# Patient Record
Sex: FEMALE | Race: WHITE | Employment: FULL TIME | URBAN - METROPOLITAN AREA
[De-identification: names, ages, dates, MRNs, and addresses within clinical notes are randomized per-mention and may not be internally consistent; named-entity substitution may affect disease eponyms.]

---

## 2017-01-18 RX ORDER — HYDROCHLOROTHIAZIDE 25 MG/1
TABLET ORAL
Qty: 30 TAB | Refills: 0 | Status: SHIPPED | OUTPATIENT
Start: 2017-01-18 | End: 2017-03-03 | Stop reason: SDUPTHER

## 2017-02-10 ENCOUNTER — DOCUMENTATION ONLY (OUTPATIENT)
Dept: FAMILY MEDICINE CLINIC | Age: 37
End: 2017-02-10

## 2017-02-10 NOTE — PROGRESS NOTES
Left message on [phone] for patient to call back. Over-due for follow-up appointment. Due:12/6/2016.

## 2017-02-28 ENCOUNTER — TELEPHONE (OUTPATIENT)
Dept: SURGERY | Age: 37
End: 2017-02-28

## 2017-03-03 ENCOUNTER — OFFICE VISIT (OUTPATIENT)
Dept: SURGERY | Age: 37
End: 2017-03-03

## 2017-03-03 VITALS
TEMPERATURE: 98.2 F | BODY MASS INDEX: 43.4 KG/M2 | SYSTOLIC BLOOD PRESSURE: 150 MMHG | WEIGHT: 293 LBS | HEIGHT: 69 IN | HEART RATE: 100 BPM | DIASTOLIC BLOOD PRESSURE: 84 MMHG

## 2017-03-03 DIAGNOSIS — I10 ESSENTIAL HYPERTENSION: Primary | ICD-10-CM

## 2017-03-03 DIAGNOSIS — N39.3 SUI (STRESS URINARY INCONTINENCE, FEMALE): ICD-10-CM

## 2017-03-03 DIAGNOSIS — R53.82 CHRONIC FATIGUE: ICD-10-CM

## 2017-03-03 DIAGNOSIS — E66.01 MORBID OBESITY WITH BMI OF 50.0-59.9, ADULT (HCC): ICD-10-CM

## 2017-03-03 DIAGNOSIS — E78.00 HYPERCHOLESTEREMIA: ICD-10-CM

## 2017-03-03 DIAGNOSIS — G47.10 HYPERSOMNOLENCE: ICD-10-CM

## 2017-03-03 RX ORDER — IBUPROFEN 200 MG
TABLET ORAL
COMMUNITY
End: 2017-06-29 | Stop reason: ALTCHOICE

## 2017-03-03 RX ORDER — LORATADINE 10 MG/1
10 TABLET ORAL DAILY
Qty: 30 TAB | Refills: 11 | Status: SHIPPED | OUTPATIENT
Start: 2017-03-03 | End: 2019-08-12

## 2017-03-03 RX ORDER — LABETALOL 200 MG/1
200 TABLET, FILM COATED ORAL 2 TIMES DAILY
Qty: 60 TAB | Refills: 0 | Status: SHIPPED | OUTPATIENT
Start: 2017-03-03 | End: 2017-04-09 | Stop reason: SDUPTHER

## 2017-03-03 RX ORDER — HYDROCHLOROTHIAZIDE 25 MG/1
25 TABLET ORAL DAILY
Qty: 30 TAB | Refills: 0 | Status: SHIPPED | OUTPATIENT
Start: 2017-03-03 | End: 2017-05-04 | Stop reason: SDUPTHER

## 2017-03-03 NOTE — TELEPHONE ENCOUNTER
From: Marie Farris  To:  Silvina Macedo MD  Sent: 3/3/2017 10:02 AM EST  Subject: Medication Renewal Request    Original authorizing provider: MD Marie Locke would like a refill of the following medications:  loratadine (CLARITIN) 10 mg tablet Silvina Macedo MD]  labetalol (NORMODYNE) 200 mg tablet Silvina Macedo MD]  hydroCHLOROthiazide (HYDRODIURIL) 25 mg tablet Silvina Macedo MD]    Preferred pharmacy: [facility] #[id number] - [place], [place] - [address]    Comment:

## 2017-03-03 NOTE — PROGRESS NOTES
Pt ID confirmed    Weight Loss Metrics 3/3/2017 3/3/2017 12/24/2016 11/8/2016 10/18/2016 6/23/2016 3/23/2016   Pre op / Initial Wt 343 - - - - - -   Today's Wt - 343 lb 341 lb 14.9 oz 340 lb 340 lb 333 lb 333 lb   BMI - 50.65 kg/m2 50.49 kg/m2 50.21 kg/m2 50.21 kg/m2 49.15 kg/m2 49.15 kg/m2   Ideal Body Wt 146 - - - - - -   Excess Body Wt 197 - - - - - -   Goal Wt 185 - - - - - -   Wt loss to date 0 - - - - - -   % Wt Loss 0 - - - - - -   80% .6 - - - - - -       Body mass index is 50.65 kg/(m^2).

## 2017-03-03 NOTE — PROGRESS NOTES
Attestation:  I have personally seen and examined this patient and independently confirmed all of the above findings noted by the CARISSA. I have independently assessed her ongoing problems and formulated the plan of treatment which will be carried out as per above. Review of Systems:  Positive in BOLD    CONST: Fever, weight loss, fatigue or chills  GI: Nausea, vomiting, abdominal pain, change in bowel habits, hematochezia, melena, and GERD   INTEG: Dermatitis, abnormal moles  HEENT: Recent changes in vision, vertigo, epistaxis, dysphagia and hoarseness  CV: Chest pain, palpitations, HTN, edema and varicosities  RESP: Cough, shortness of breath, wheezing, hemoptysis, snoring and reactive airway disease  : Hematuria, dysuria, frequency, urgency, nocturia and stress urinary incontinence   MS: Weakness, joint pain and arthritis  ENDO: Diabetes, thyroid disease, polyuria, polydipsia, polyphagia, poor wound healing, heat intolerance, cold intolerance  LYMPH/HEME: Anemia, bruising and history of blood transfusions  NEURO: Dizziness, headache, fainting, seizures and stroke  PSYCH: Anxiety and depression    Dre Sue.  Vangie Modi MD, FACS

## 2017-03-03 NOTE — PROGRESS NOTES
Initial Consultation for Bariatric Surgery     Jerrod Armas is a 44 y.o. female who comes into the office today for initial consultation for the surgical options for the treatment of morbid obesity. She has tried a variety of weight-loss attempts but has yet to meet with lasting success. She has lost weight on various diet programs, but that weight always seems to return. Maximum weight lost on a diet is about 20 lbs, but that the weight always seems to return. Today, she is Height: 5' 9\" (175.3 cm) , Weight: 155.6 kg (343 lb) for a BMI of Body mass index is 50.65 kg/(m^2). Maximum weight is today. It is due to severe obesity, which is further complicated by comorbid conditions such as hypertension, hyperlipidemia, obstructive sleep apnea - clinical and weight related arthopathies that patient is now seeking out bariatric surgery, specifically, the gastric bypass. Weight Loss Metrics 3/3/2017 3/3/2017 12/24/2016 11/8/2016 10/18/2016 6/23/2016 3/23/2016   Pre op / Initial Wt 343 - - - - - -   Today's Wt - 343 lb 341 lb 14.9 oz 340 lb 340 lb 333 lb 333 lb   BMI - 50.65 kg/m2 50.49 kg/m2 50.21 kg/m2 50.21 kg/m2 49.15 kg/m2 49.15 kg/m2   Ideal Body Wt 146 - - - - - -   Excess Body Wt 197 - - - - - -   Goal Wt 185 - - - - - -   Wt loss to date 0 - - - - - -   % Wt Loss 0 - - - - - -   80% .6 - - - - - -       Body mass index is 50.65 kg/(m^2).         Past Medical History:   Diagnosis Date    ADD (attention deficit disorder) 14 y/o     d/hanny meds 23 y/o    Cyst of left breast 3/15     6 month  for ff-up 9/15    Endometriosis     HTN (hypertension) 3/4/2010    Irregular menses     Kidney stone 2007    Migraine 3/4/2010    prn excedrine migraine OTC, trigger heat/dehydration    Morbid obesity ([address]     Pelvic cramping        Past Surgical History:   Procedure Laterality Date    HX GYN  7/2002    endometriosis, lysis of adhesions d and c       Current Outpatient Prescriptions   Medication Sig Dispense Refill    ibuprofen (MOTRIN) 200 mg tablet Take  by mouth.  hydroCHLOROthiazide (HYDRODIURIL) 25 mg tablet TAKE 1 TAB BY MOUTH DAILY. 30 Tab 0    aspirin-acetaminophen-caffeine (EXCEDRIN ES) 250-250-65 mg per tablet Take 2 Tabs by mouth. Indications: PAIN      labetalol (NORMODYNE) 200 mg tablet Take 1 Tab by mouth two (2) times a day. 60 Tab 0    loratadine (CLARITIN) 10 mg tablet Take 1 Tab by mouth daily. 30 Tab 11         No Known Allergies    Social History   Substance Use Topics    Smoking status: Former Smoker     Packs/day: 0.25     Quit date: 8/1/2002    Smokeless tobacco: Never Used      Comment: started 22y/o    Alcohol use Yes      Comment: states rare   ,  supportive, , 2 kids 13,58JJ    Family History   Problem Relation Age of Onset    Alcohol abuse Mother     Ovarian Cancer Mother 54    Headache Mother     Osteoporosis Mother     Headache Father     Hypertension Father     Diabetes Father     Osteoporosis Maternal Grandmother     Cancer Maternal Grandmother     Hypertension Maternal Grandfather     Headache Paternal Grandmother     Arthritis-osteo Paternal Grandmother     Diabetes Paternal Grandfather     Stroke Paternal Grandfather        ROS  Review of Systems   Constitutional: Positive for malaise/fatigue. Respiratory: Positive for shortness of breath. Cardiovascular: Positive for leg swelling. Musculoskeletal: Positive for joint pain. All other systems reviewed and are negative. Physical Exam:  Visit Vitals    /84    Pulse 100    Temp 98.2 °F (36.8 °C)    Ht 5' 9\" (1.753 m)    Wt 155.6 kg (343 lb)    BMI 50.65 kg/m2       Physical Exam   Constitutional: She is oriented to person, place, and time and well-developed, well-nourished, and in no distress. HENT:   Head: Normocephalic and atraumatic.    Mouth/Throat: Oropharynx is clear and moist.   Eyes: Conjunctivae and EOM are normal. Pupils are equal, round, and reactive to light. Neck: Normal range of motion. Neck supple. No thyromegaly present. Cardiovascular: Normal rate, regular rhythm and normal heart sounds. Exam reveals no gallop and no friction rub. No murmur heard. Pulmonary/Chest: Effort normal and breath sounds normal. No respiratory distress. She has no wheezes. She has no rales. She exhibits no tenderness. Abdominal: Soft. Bowel sounds are normal. She exhibits no distension and no mass. There is no tenderness. There is no rebound and no guarding. Costal margins not palpated   Musculoskeletal: Normal range of motion. She exhibits no edema or tenderness. Lymphadenopathy:     She has no cervical adenopathy. Neurological: She is alert and oriented to person, place, and time. Gait normal.   Skin: Skin is warm and dry. No rash noted. No erythema. No pallor. Psychiatric: Mood, memory, affect and judgment normal.       Impression:    Warren Astorga is a 44 y.o. female who is suffering from morbid obesity with a BMI of Body mass index is 50.65 kg/(m^2). comorbidities as above who could benefit from bariatric surgery. She seems to be a reasonable candidate for gastric bypass. We have discussed the risks, benefits and likely outcomes of gastric bypass. She has watched the online seminar and feels well informed. The patient understands the likelihood of losing approximately 60-80 % of her excess weight in 12 to 18 months. The patient also understands the risks including but not limited to bleeding, infection, need for reoperation, anastomotic ulcers, leaks and strictures, bowel obstruction secondary to adhesions and internal hernias, DVT, PE, heart attack, stroke, and death.    At this time we will enroll the patient in our bariatric program, undertake routine laboratory evaluation, chest X-ray, EKG, evaluation by nutritionist as well as psychologist.    Pending her satisfactory completion of the preop evaluation, we will plan to perform a gastric bypass. She will have further education before a final decision about surgery including the pre-op teaching class and a pre-op visit with me. Total of 30 minutes of the 45 minute visit was spent in counseling.     Of note, She has the following specific issues that we have discussed as part of her evaluation: sleep study    F/u at interim visit, sooner rosa m Suarez PA-C

## 2017-03-07 ENCOUNTER — TELEPHONE (OUTPATIENT)
Dept: SURGERY | Age: 37
End: 2017-03-07

## 2017-03-09 ENCOUNTER — PATIENT MESSAGE (OUTPATIENT)
Dept: SURGERY | Age: 37
End: 2017-03-09

## 2017-03-23 ENCOUNTER — HOSPITAL ENCOUNTER (OUTPATIENT)
Dept: BARIATRICS/WEIGHT MGMT | Age: 37
Discharge: HOME | End: 2017-03-23

## 2017-03-23 ENCOUNTER — DOCUMENTATION ONLY (OUTPATIENT)
Dept: BARIATRICS/WEIGHT MGMT | Age: 37
End: 2017-03-23

## 2017-03-23 NOTE — PROGRESS NOTES
[address] [place] [address] [facility], [address]    Patient's Name: Donnie Eden   Age: 44 y.o. YOB: 1980   Sex: female    Date:   3/23/2017    Insurance:  Amparo Curiel          Session: 1 of 4  Revision:   Surgeon:  Dr. Tong Osman    Height: 5 f 9 Weight:    351      Lbs. BMI: 51.9   Pounds Lost since last month: 0               Pounds Gained since last month: 8    Starting Weight: 343   Previous Months Weight: 343  Overall Pounds Lost: 0 Overall Pounds Gained: 8      Do you smoke? None    Alcohol intake:  Number of drinks at a time:  None  Number of times a week: None    Class Guidelines    Patient understands that weight loss trial classes must be consecutive. Patient understands if they miss a class, it is their responsibility to contact me to reschedule class. Patient understands the expectations that weight maintenance/weight loss is expected during the classes. Failure to demonstrate changes may result in one extra month of weight loss trial, followed by going back to see the surgeon. Other Pertinent Information:     Changes Made Since Last Class: Limited to one to no soda a day. Increased water. Eating Habits and Behaviors      Today we spent some time talking about the key diet principles. We spent some time reading labels. I have discussed with patient what carbohydrates are and how many carbohydrates are approximately in food. Patient understands that one serving of starch is ~ 15 grams grams of carbohydrates, one serving of fruit is ~ 15 grams of carbohydrates, and 1 serving of dairy is ~ 12 grams of carbohydrates. Patient was made aware that protein, i.e., chicken, fish, lean beef, shellfish, yogurt, cottage cheese, lean pork, egg have little to no carbohydrates. Vegetables have approximately 5 grams of carbohydrates in 1 cup raw or 1/2 cup cooked.   Discussed with patient that the average American eats between 400-500 grams of carbohydrates per day. Patient is encouraged to keep daily carbohydrates less than 100 grams during weight loss trial.    I also discussed protein-based breakfast choices and protein-based snacks. Patient was instructed to avoid cereal, bagels, breakfast sandwiches or other carbohydrates. I brought in 5 food items includin cup of cereal, 1 ounce bag of chips, a package of 4 peanut butter crackers, 1 packet of instant Weight Control Oatmeal, 3 Smarties candy, and a 24 ounces of Pepsi. Patient was asked to guess how many carbohydrates were in this portion. The idea was hopefully to illustrate how quickly carbohydrates add up. Patient's current diet habits include: 3 meals a day. Most of the snacking is in the evening and in between meals. She states she is struggling the most with portions, snacking, and a high amount of carbohydrates. She is eating out daily, which I have talked about with patient and the need to do more planning ahead. She is drinking 44 ounces of water and 20 ounces of sweet tea, which I have addressed. Physical Activity/Exercise    Comments:  During class, I discussed with patient the importance of getting into an exercise routine. We talked about how strength training can help one's metabolism  Currently, patient is not doing anything for activity. Goals have been set. Behavior Modification       Comments:   Reinforced to patient some of the behavior modifications that need to be made in order to be successful long term. Patient was also given a handout on Staying Motivated. Patient was encouraged to identify their Triggering Circumstances and Reasons for Slips in Motivation. Responses ranged from disappointed on the scale, to going on vacation, to not planning ahead and then making poor choices, to falling off the wagon one day and not getting back on the next. Patient was asked to identify some of the accomplishments that have reached so far. This included dropping a few pounds since last month, to starting a walking routine, to stopping soda. We talked about ways to continue to keep motivation level high.     Shirley Deshpande Elvin [address]  3/23/2017

## 2017-03-25 ENCOUNTER — HOSPITAL ENCOUNTER (OUTPATIENT)
Dept: LAB | Age: 37
Discharge: HOME | End: 2017-03-25

## 2017-03-25 ENCOUNTER — HOSPITAL ENCOUNTER (OUTPATIENT)
Dept: LAB | Age: 37
Discharge: HOME | End: 2017-03-25
Payer: COMMERCIAL

## 2017-03-25 DIAGNOSIS — G47.10 HYPERSOMNOLENCE: ICD-10-CM

## 2017-03-25 DIAGNOSIS — E66.01 MORBID OBESITY WITH BMI OF 50.0-59.9, ADULT (HCC): ICD-10-CM

## 2017-03-25 LAB
ATRIAL RATE: 88 BPM
CALCULATED P AXIS, ECG09: 9 DEGREES
CALCULATED R AXIS, ECG10: 61 DEGREES
CALCULATED T AXIS, ECG11: 44 DEGREES
DIAGNOSIS, 93000: NORMAL
P-R INTERVAL, ECG05: 158 MS
Q-T INTERVAL, ECG07: 380 MS
QRS DURATION, ECG06: 94 MS
QTC CALCULATION (BEZET), ECG08: 459 MS
VENTRICULAR RATE, ECG03: 88 BPM

## 2017-03-25 PROCEDURE — 93005 ELECTROCARDIOGRAM TRACING: CPT

## 2017-03-25 PROCEDURE — 99001 SPECIMEN HANDLING PT-LAB: CPT | Performed by: PHYSICIAN ASSISTANT

## 2017-03-28 LAB
25(OH)D3+25(OH)D2 SERPL-MCNC: 10.8 NG/ML (ref 30–100)
ALBUMIN SERPL-MCNC: 3.9 G/DL (ref 3.5–5.5)
ALBUMIN/GLOB SERPL: 1.4 {RATIO} (ref 1.2–2.2)
ALP SERPL-CCNC: 74 IU/L (ref 39–117)
ALT SERPL-CCNC: 15 IU/L (ref 0–32)
AST SERPL-CCNC: 17 IU/L (ref 0–40)
BASOPHILS # BLD AUTO: 0 X10E3/UL (ref 0–0.2)
BASOPHILS NFR BLD AUTO: 1 %
BILIRUB SERPL-MCNC: 0.6 MG/DL (ref 0–1.2)
BUN SERPL-MCNC: 13 MG/DL (ref 6–20)
BUN/CREAT SERPL: 19 (ref 8–20)
CALCIUM SERPL-MCNC: 9 MG/DL (ref 8.7–10.2)
CHLORIDE SERPL-SCNC: 98 MMOL/L (ref 96–106)
CO2 SERPL-SCNC: 22 MMOL/L (ref 18–29)
CREAT SERPL-MCNC: 0.68 MG/DL (ref 0.57–1)
EOSINOPHIL # BLD AUTO: 0.1 X10E3/UL (ref 0–0.4)
EOSINOPHIL NFR BLD AUTO: 1 %
ERYTHROCYTE [DISTWIDTH] IN BLOOD BY AUTOMATED COUNT: 13.6 % (ref 12.3–15.4)
EST. AVERAGE GLUCOSE BLD GHB EST-MCNC: 131 MG/DL
FERRITIN SERPL-MCNC: 25 NG/ML (ref 15–150)
FOLATE SERPL-MCNC: 12.6 NG/ML
GLOBULIN SER CALC-MCNC: 2.7 G/DL (ref 1.5–4.5)
GLUCOSE SERPL-MCNC: 111 MG/DL (ref 65–99)
H PYLORI IGM SER-ACNC: <9 UNITS (ref 0–8.9)
HBA1C MFR BLD: 6.2 % (ref 4.8–5.6)
HCT VFR BLD AUTO: 35.5 % (ref 34–46.6)
HGB BLD-MCNC: 11.6 G/DL (ref 11.1–15.9)
IMM GRANULOCYTES # BLD: 0 X10E3/UL (ref 0–0.1)
IMM GRANULOCYTES NFR BLD: 0 %
IRON SERPL-MCNC: 77 UG/DL (ref 27–159)
LYMPHOCYTES # BLD AUTO: 1.7 X10E3/UL (ref 0.7–3.1)
LYMPHOCYTES NFR BLD AUTO: 22 %
MCH RBC QN AUTO: 26.6 PG (ref 26.6–33)
MCHC RBC AUTO-ENTMCNC: 32.7 G/DL (ref 31.5–35.7)
MCV RBC AUTO: 81 FL (ref 79–97)
MONOCYTES # BLD AUTO: 0.4 X10E3/UL (ref 0.1–0.9)
MONOCYTES NFR BLD AUTO: 5 %
NEUTROPHILS # BLD AUTO: 5.5 X10E3/UL (ref 1.4–7)
NEUTROPHILS NFR BLD AUTO: 71 %
PLATELET # BLD AUTO: 433 X10E3/UL (ref 150–379)
POTASSIUM SERPL-SCNC: 4.2 MMOL/L (ref 3.5–5.2)
PROT SERPL-MCNC: 6.6 G/DL (ref 6–8.5)
RBC # BLD AUTO: 4.36 X10E6/UL (ref 3.77–5.28)
SODIUM SERPL-SCNC: 136 MMOL/L (ref 134–144)
TSH SERPL DL<=0.005 MIU/L-ACNC: 2.67 UIU/ML (ref 0.45–4.5)
VIT B1 BLD-SCNC: 145.4 NMOL/L (ref 66.5–200)
VIT B12 SERPL-MCNC: 294 PG/ML (ref 211–946)
WBC # BLD AUTO: 7.7 X10E3/UL (ref 3.4–10.8)

## 2017-04-10 ENCOUNTER — TELEPHONE (OUTPATIENT)
Dept: SURGERY | Age: 37
End: 2017-04-10

## 2017-04-10 PROBLEM — E55.9 HYPOVITAMINOSIS D: Status: ACTIVE | Noted: 2017-04-10

## 2017-04-10 RX ORDER — LABETALOL 200 MG/1
TABLET, FILM COATED ORAL
Qty: 60 TAB | Refills: 0 | Status: SHIPPED | OUTPATIENT
Start: 2017-04-10 | End: 2017-05-05 | Stop reason: SDUPTHER

## 2017-04-10 RX ORDER — HYDROCHLOROTHIAZIDE 25 MG/1
TABLET ORAL
Qty: 30 TAB | Refills: 0 | Status: SHIPPED | OUTPATIENT
Start: 2017-04-10 | End: 2017-05-05 | Stop reason: SDUPTHER

## 2017-04-10 NOTE — TELEPHONE ENCOUNTER
Patient has been schedule appointment for 05/05/5017 at 10:30 am. Patient aware no more refills until seen by Dr. Moises Nolan.

## 2017-04-27 ENCOUNTER — DOCUMENTATION ONLY (OUTPATIENT)
Dept: BARIATRICS/WEIGHT MGMT | Age: 37
End: 2017-04-27

## 2017-04-27 ENCOUNTER — HOSPITAL ENCOUNTER (OUTPATIENT)
Dept: BARIATRICS/WEIGHT MGMT | Age: 37
Discharge: HOME | End: 2017-04-27

## 2017-04-27 NOTE — PROGRESS NOTES
[address] [place] [address] [facility], [address]    Patient's Name: Steven Avila   Age: 44 y.o. YOB: 1980   Sex: female    Date:   4/27/2017    Insurance:            Session: 2 of 4  Revision:   Surgeon:  Dr. Brandie Sears    Height: 5 f 9 Weight:    332      Lbs. BMI: 49.1   Pounds Lost since last month: 19               Pounds Gained since last month: 0    Starting Weight: 343   Previous Months Weight: 351  Overall Pounds Lost: 11 Overall Pounds Gained: 0      Do you smoke? None    Alcohol intake:  Number of drinks at a time:  NOne  Number of times a week: None    Class Guidelines    Patient understands that weight loss trial classes must be consecutive. Patient understands if they miss a class, it is their responsibility to contact me to reschedule class. Patient understands the expectations that weight maintenance/weight loss is expected during the classes. Failure to demonstrate changes may result in one extra month of weight loss trial, followed by going back to see the surgeon. Other Pertinent Information:     Changes made to diet since last month: Little to no carbohydrates. No soda. Eating Habits and Behaviors    During class, we focused on the normal key diet principles. We talked about the importance of not drinking liquid calories and aiming for 64 ounces of water per day. We also talked about cutting out carbohydrates. I discussed with patient that the average American consumes 400-500 grams of carbohydrates per day. Patient was instructed to cut out carbohydrates and aim for 100 grams or less. Today's lesson focused a lot on label reading. I first gave patient a power point on label reading. Throughout the power point, there were questions, such as how many calories are in 2 servings of a certain product. I then compared sugar free cookies to regular cookies for the patient.   The point of this activity was to demonstrate to them that sugar free cookies and other sugar free products do not mean calorie-free or carbohydrate-free and these foods should be avoid. We also looked at a box of whole wheat pasta and although it is whole wheat, it still has 42 grams of carbohydrates per 2 ounces. I have reinforced to patient to cut out breads, rice, pasta, cereal, and crackers. Patient's current diet habits include: 3 meals a day. States she is snacking on cheese sticks, applesauce, pickles, or nuts between meals. She states she has avoided sweets completely since last month and has started counting her carbohydrate intake. She is drinking 96 ounces of water per day and is trying to limit her intake of eating out. She has made a lot of diet changes in the last month. Physical Activity/Exercise    Comments:  During class, I discussed with patient the importance of getting into an exercise routine. Patient was encouraged to purchase a pedometer to track steps. Patient is currently doing cardio for 30 minutes and strength training 2-3 time a week for activity. Behavior Modification       Comments: In class, we focused on behavior principles. Many patients are not eating 3 meals a day. Some patients aren't eating 3 meals per day because of time, others are not eating it because they are not hungry. Talked with patient that breakfast stands for Break the Fast and it is essential that they get something on their system within 1 hour of waking up. Breakfast should focus on protein. This is also important to get the metabolism going. I have also talked to patient about limiting the places that they eat. All meals and snacks are encouraged to be consumed at a table.       Devaughn Batres, MS RD  4/27/2017

## 2017-05-04 ENCOUNTER — OFFICE VISIT (OUTPATIENT)
Dept: SURGERY | Age: 37
End: 2017-05-04

## 2017-05-04 VITALS
HEIGHT: 69 IN | RESPIRATION RATE: 18 BRPM | SYSTOLIC BLOOD PRESSURE: 122 MMHG | WEIGHT: 293 LBS | BODY MASS INDEX: 43.4 KG/M2 | DIASTOLIC BLOOD PRESSURE: 86 MMHG | TEMPERATURE: 97.9 F | HEART RATE: 88 BPM

## 2017-05-04 DIAGNOSIS — N39.3 SUI (STRESS URINARY INCONTINENCE, FEMALE): ICD-10-CM

## 2017-05-04 DIAGNOSIS — E78.00 HYPERCHOLESTEREMIA: ICD-10-CM

## 2017-05-04 DIAGNOSIS — G47.33 OSA (OBSTRUCTIVE SLEEP APNEA): ICD-10-CM

## 2017-05-04 DIAGNOSIS — E55.9 HYPOVITAMINOSIS D: ICD-10-CM

## 2017-05-04 DIAGNOSIS — E66.01 MORBID OBESITY WITH BMI OF 50.0-59.9, ADULT (HCC): Primary | ICD-10-CM

## 2017-05-04 DIAGNOSIS — I10 ESSENTIAL HYPERTENSION: ICD-10-CM

## 2017-05-04 RX ORDER — CHOLECALCIFEROL TAB 125 MCG (5000 UNIT) 125 MCG
5000 TAB ORAL DAILY
COMMUNITY
End: 2018-08-21

## 2017-05-04 RX ORDER — MULTIVITAMIN WITH IRON
1 TABLET ORAL 2 TIMES DAILY
COMMUNITY
End: 2018-08-21

## 2017-05-04 NOTE — PROGRESS NOTES
Bariatric Midtrial   Returns during WLT to discuss status. Having no issues. Making positive changes. Still wants a bypass    Past Medical History:   Diagnosis Date    ADD (attention deficit disorder) 14 y/o     d/hanny meds 25 y/o    Cyst of left breast 3/15     6 month  for ff-up 9/15    Endometriosis     HTN (hypertension) 3/4/2010    Irregular menses     Kidney stone 2007    Migraine 3/4/2010    prn excedrine migraine OTC, trigger heat/dehydration    Morbid obesity ([address]     Pelvic cramping      Past Surgical History:   Procedure Laterality Date    HX GYN  7/2002    endometriosis, lysis of adhesions d and c     No Known Allergies  Current Outpatient Prescriptions   Medication Sig Dispense Refill    cholecalciferol, VITAMIN D3, (VITAMIN D3) 5,000 unit tab tablet Take  by mouth daily.  multivitamin with iron tablet Take 1 Tab by mouth daily.  hydroCHLOROthiazide (HYDRODIURIL) 25 mg tablet TAKE 1 TAB BY MOUTH DAILY. 30 Tab 0    labetalol (NORMODYNE) 200 mg tablet TAKE 1 TABLET BY MOUTH TWICE A DAY 60 Tab 0    loratadine (CLARITIN) 10 mg tablet Take 1 Tab by mouth daily. 30 Tab 11    ibuprofen (MOTRIN) 200 mg tablet Take  by mouth.  aspirin-acetaminophen-caffeine (EXCEDRIN ES) 250-250-65 mg per tablet Take 2 Tabs by mouth.  Indications: PAIN       Social History     Social History    Marital status:      Spouse name: N/A    Number of children: N/A    Years of education: N/A     Social History Main Topics    Smoking status: Former Smoker     Packs/day: 0.25     Quit date: 8/1/2002    Smokeless tobacco: Never Used      Comment: started 22y/o    Alcohol use Yes      Comment: states rare    Drug use: No    Sexual activity: Yes     Partners: Male     Birth control/ protection: Condom     Other Topics Concern    None     Social History Narrative     Family History   Problem Relation Age of Onset    Alcohol abuse Mother     Ovarian Cancer Mother 54    Headache Mother     Osteoporosis Mother     Headache Father     Hypertension Father     Diabetes Father     Osteoporosis Maternal Grandmother     Cancer Maternal Grandmother     Hypertension Maternal Grandfather     Headache Paternal Grandmother     Arthritis-osteo Paternal Grandmother     Diabetes Paternal Grandfather     Stroke Paternal Grandfather      Family Status   Relation Status    Mother     Father     Maternal Grandmother     Maternal Grandfather     Paternal Grandmother     Paternal Grandfather        No change in ROS since March except new sleep apnea diagnosis    Visit Vitals    /86    Pulse 88    Temp 97.9 °F (36.6 °C)    Resp 18    Ht 5' 9\" (1.753 m)    Wt 151 kg (333 lb)    BMI 49.18 kg/m2       Pulm: CTA   CV: RRR   Abd: soft, nontender, easily palp costal margin and  no hernias     Labs: hypovitaminosis D - treating   EKG - NSR  Psych- pending  Nutr - 2/4 complete - 11 lbs lost   Imp:   Doing well - really working on cutting carbs. Proceed with completion of WLT. All questions answered.  Hoping for surgery in July

## 2017-05-04 NOTE — LETTER
5/4/2017 10:46 AM 
 
Patient:  Lele Vergara YOB: 1980 Date of Visit: 5/4/2017 Cesia Kennedy, [address] [place] [address] VIA In Basket Dear Cesia Kennedy MD, Thank you for referring Ms. Lele Vergara to [facility] for evaluation and treatment. Below are the relevant portions of my assessment and plan of care. Bariatric Midtrial  
Returns during WLT to discuss status. Having no issues. Making positive changes. Still wants a bypass Past Medical History:  
Diagnosis Date  ADD (attention deficit disorder) 14 y/o   
 d/hanny meds 25 y/o  Cyst of left breast 3/15  
  6 month US for ff-up 9/15  Endometriosis  HTN (hypertension) 3/4/2010  Irregular menses  Kidney stone 2007  Migraine 3/4/2010  
 prn excedrine migraine OTC, trigger heat/dehydration  Morbid obesity ([address]  Pelvic cramping Past Surgical History:  
Procedure Laterality Date  HX GYN  7/2002  
 endometriosis, lysis of adhesions d and c No Known Allergies Current Outpatient Prescriptions Medication Sig Dispense Refill  cholecalciferol, VITAMIN D3, (VITAMIN D3) 5,000 unit tab tablet Take  by mouth daily.  multivitamin with iron tablet Take 1 Tab by mouth daily.  hydroCHLOROthiazide (HYDRODIURIL) 25 mg tablet TAKE 1 TAB BY MOUTH DAILY. 30 Tab 0  
 labetalol (NORMODYNE) 200 mg tablet TAKE 1 TABLET BY MOUTH TWICE A DAY 60 Tab 0  
 loratadine (CLARITIN) 10 mg tablet Take 1 Tab by mouth daily. 30 Tab 11  
 ibuprofen (MOTRIN) 200 mg tablet Take  by mouth.  aspirin-acetaminophen-caffeine (EXCEDRIN ES) 250-250-65 mg per tablet Take 2 Tabs by mouth. Indications: PAIN Social History Social History  Marital status:  Spouse name: N/A  
 Number of children: N/A  
 Years of education: N/A Social History Main Topics  Smoking status: Former Smoker   Packs/day: 0.25  
 Quit date: 8/1/2002  Smokeless tobacco: Never Used Comment: started 20y/o  Alcohol use Yes Comment: states rare  Drug use: No  
 Sexual activity: Yes  
  Partners: Male Birth control/ protection: Condom Other Topics Concern  None Social History Narrative Family History Problem Relation Age of Onset  Alcohol abuse Mother  Ovarian Cancer Mother 54  
 Headache Mother  Osteoporosis Mother  Headache Father  Hypertension Father  Diabetes Father  Osteoporosis Maternal Grandmother  Cancer Maternal Grandmother  Hypertension Maternal Grandfather  Headache Paternal Grandmother  Arthritis-osteo Paternal Grandmother  Diabetes Paternal Grandfather  Stroke Paternal Grandfather Family Status Relation Status  Mother  Father  Maternal Grandmother  Maternal Grandfather  Paternal Grandmother  Paternal Grandfather No change in ROS since March except new sleep apnea diagnosis Visit Vitals  /86  Pulse 88  Temp 97.9 °F (36.6 °C)  Resp 18  Ht 5' 9\" (1.753 m)  Wt 151 kg (333 lb)  BMI 49.18 kg/m2 Pulm: CTA  
CV: RRR Abd: soft, nontender, easily palp costal margin and  no hernias Labs: hypovitaminosis D - treating EKG - NSR Psych- pending Nutr - 2/4 complete - 11 lbs lost  
Imp:  
Doing well - really working on cutting carbs. Proceed with completion of WLT. All questions answered. Hoping for surgery in July Thank you very much for your referral of Ms. Marva Larsen. If you have questions, please do not hesitate to call me. I look forward to following Ms. Dayna Celis along with you and will keep you updated as to her progress.   
 
 
 
 
Sincerely, 
 
 
Maria R Kumar MD

## 2017-05-04 NOTE — PROGRESS NOTES
1. Have you been to the ER, urgent care clinic since your last visit? Hospitalized since your last visit? No    2. Have you seen or consulted any other health care providers outside of the [address] [place] [address] since your last visit? Include any pap smears or colon screening.  Yes pulmonary for sleep eval

## 2017-05-05 ENCOUNTER — OFFICE VISIT (OUTPATIENT)
Dept: FAMILY MEDICINE CLINIC | Age: 37
End: 2017-05-05

## 2017-05-05 VITALS
SYSTOLIC BLOOD PRESSURE: 138 MMHG | HEIGHT: 69 IN | HEART RATE: 88 BPM | TEMPERATURE: 98.1 F | RESPIRATION RATE: 16 BRPM | DIASTOLIC BLOOD PRESSURE: 88 MMHG | BODY MASS INDEX: 43.4 KG/M2 | OXYGEN SATURATION: 98 % | WEIGHT: 293 LBS

## 2017-05-05 DIAGNOSIS — Z99.89 OSA ON CPAP: ICD-10-CM

## 2017-05-05 DIAGNOSIS — I10 ESSENTIAL HYPERTENSION: Primary | ICD-10-CM

## 2017-05-05 DIAGNOSIS — E78.9 BORDERLINE HIGH CHOLESTEROL: ICD-10-CM

## 2017-05-05 DIAGNOSIS — G47.33 OSA ON CPAP: ICD-10-CM

## 2017-05-05 DIAGNOSIS — R73.03 PREDIABETES: ICD-10-CM

## 2017-05-05 DIAGNOSIS — E55.9 HYPOVITAMINOSIS D: ICD-10-CM

## 2017-05-05 PROBLEM — E66.01 MORBID OBESITY WITH BMI OF 50.0-59.9, ADULT (HCC): Status: RESOLVED | Noted: 2017-03-03 | Resolved: 2017-05-05

## 2017-05-05 RX ORDER — LABETALOL 200 MG/1
TABLET, FILM COATED ORAL
Qty: 180 TAB | Refills: 2 | Status: SHIPPED | OUTPATIENT
Start: 2017-05-05 | End: 2018-08-21

## 2017-05-05 RX ORDER — HYDROCHLOROTHIAZIDE 25 MG/1
TABLET ORAL
Qty: 90 TAB | Refills: 2 | Status: SHIPPED | OUTPATIENT
Start: 2017-05-05 | End: 2017-07-26

## 2017-05-05 NOTE — PROGRESS NOTES
Chief Complaint   Patient presents with    Hypertension     1. Have you been to the ER, urgent care clinic since your last visit? Hospitalized since your last visit? No    2. Have you seen or consulted any other health care providers outside of the [address] [place] [address] since your last visit? Include any pap smears or colon screening.  No

## 2017-05-05 NOTE — PROGRESS NOTES
Kristen Astorga, 44 y.o.,  female    SUBJECTIVE  Ff-up    HTN- added HCTZ on last visit to labetalol. Lost weight, enrolled in gastric bypass program now thinking July for procedure. Reviewed note and labs from dr. Alfred Whitlock. She is on track with wt loss, prediabetes, vit d def. They are replacing vit d. Says dr. Richy Hay preformed lap adhesiolysis and D/C for endometriosis since last visit    Also established with dr. Venus Bolaños dx with BRIAN, to receive CPAP soon. ROS:  See HPI, all others negative        Patient Active Problem List   Diagnosis Code    Migraine G43.909    HTN (hypertension) I10    Hypercholesteremia E78.00    Morbid obesity ([address] E66.01    Endometriosis N80.9    Breast cyst N60.09    Borderline high cholesterol E78.9    AZAEL (stress urinary incontinence, female) N39.3    Hypersomnolence G47.10    Chronic fatigue R53.82    Hypovitaminosis D E55.9    BRIAN (obstructive sleep apnea) G47.33    BMI 50.0-59.9, adult (HCC) Z68.43    Prediabetes R73.03    BRIAN on CPAP G47.33, Z99.89       Current Outpatient Prescriptions   Medication Sig Dispense Refill    hydroCHLOROthiazide (HYDRODIURIL) 25 mg tablet TAKE 1 TAB BY MOUTH DAILY. 90 Tab 2    labetalol (NORMODYNE) 200 mg tablet TAKE 1 TABLET BY MOUTH TWICE A  Tab 2    cholecalciferol, VITAMIN D3, (VITAMIN D3) 5,000 unit tab tablet Take  by mouth daily.  multivitamin with iron tablet Take 1 Tab by mouth daily.  loratadine (CLARITIN) 10 mg tablet Take 1 Tab by mouth daily. 30 Tab 11    ibuprofen (MOTRIN) 200 mg tablet Take  by mouth.  aspirin-acetaminophen-caffeine (EXCEDRIN ES) 250-250-65 mg per tablet Take 2 Tabs by mouth.  Indications: PAIN         No Known Allergies    Past Medical History:   Diagnosis Date    ADD (attention deficit disorder) 14 y/o     d/hanny meds 25 y/o    Cyst of left breast 3/15     6 month  for ff-up 9/15    Endometriosis     HTN (hypertension) 3/4/2010    Irregular menses     Kidney stone 2007  Migraine 3/4/2010    prn excedrine migraine OTC, trigger heat/dehydration    Morbid obesity ([address]     BRIAN on CPAP     Pelvic cramping     Prediabetes        Social History     Social History    Marital status:      Spouse name: N/A    Number of children: N/A    Years of education: N/A     Occupational History    Not on file. Social History Main Topics    Smoking status: Former Smoker     Packs/day: 0.25     Quit date: 8/1/2002    Smokeless tobacco: Never Used      Comment: started 22y/o    Alcohol use Yes      Comment: states rare    Drug use: No    Sexual activity: Yes     Partners: Male     Birth control/ protection: Condom     Other Topics Concern    Not on file     Social History Narrative       Family History   Problem Relation Age of Onset    Alcohol abuse Mother     Ovarian Cancer Mother 54    Headache Mother     Osteoporosis Mother     Headache Father     Hypertension Father     Diabetes Father     Osteoporosis Maternal Grandmother     Cancer Maternal Grandmother     Hypertension Maternal Grandfather     Headache Paternal Grandmother     Arthritis-osteo Paternal Grandmother     Diabetes Paternal Grandfather     Stroke Paternal Grandfather          OBJECTIVE    Physical Exam:     Visit Vitals    /88 (BP 1 Location: Left arm, BP Patient Position: Sitting)    Pulse 88    Temp 98.1 °F (36.7 °C) (Oral)    Resp 16    Ht 5' 9\" (1.753 m)    Wt 333 lb (151 kg)    SpO2 98%    BMI 49.18 kg/m2       General: alert, well-appearing,  in no apparent distress or pain  CVS: normal rate, regular rhythm, distinct S1 and S2  Lungs:clear to ausculation bilaterally, no crackles, wheezing or rhonchi noted  Abdomen: normoactive bowel sounds, soft, non-tender  Extremities: no edema, no cyanosis,  Skin: warm, no lesions, rashes noted  Psych:  mood and affect normal      ASSESSMENT/PLAN  Dominique Bo was seen today for hypertension.     Diagnoses and all orders for this visit:    Essential hypertension  Controlled, cont labetalol/hctz    Hypovitaminosis D  repleted by dr. Laureen Aranda, considering gastric bypass in the summer. Commended on wt loss    Borderline high cholesterol    Prediabetes    BRIAN on CPAP    Other orders  -     hydroCHLOROthiazide (HYDRODIURIL) 25 mg tablet; TAKE 1 TAB BY MOUTH DAILY. -     labetalol (NORMODYNE) 200 mg tablet; TAKE 1 TABLET BY MOUTH TWICE A DAY        Follow-up Disposition:  Return in about 7 months (around 12/5/2017), or if symptoms worsen or fail to improve. Patient understands plan of care. Patient has provided input and agrees with goals.

## 2017-05-05 NOTE — PATIENT INSTRUCTIONS
DASH Diet: Care Instructions  Your Care Instructions  The DASH diet is an eating plan that can help lower your blood pressure. DASH stands for Dietary Approaches to Stop Hypertension. Hypertension is high blood pressure. The DASH diet focuses on eating foods that are high in calcium, potassium, and magnesium. These nutrients can lower blood pressure. The foods that are highest in these nutrients are fruits, vegetables, low-fat dairy products, nuts, seeds, and legumes. But taking calcium, potassium, and magnesium supplements instead of eating foods that are high in those nutrients does not have the same effect. The DASH diet also includes whole grains, fish, and poultry. The DASH diet is one of several lifestyle changes your doctor may recommend to lower your high blood pressure. Your doctor may also want you to decrease the amount of sodium in your diet. Lowering sodium while following the DASH diet can lower blood pressure even further than just the DASH diet alone. Follow-up care is a key part of your treatment and safety. Be sure to make and go to all appointments, and call your doctor if you are having problems. It's also a good idea to know your test results and keep a list of the medicines you take. How can you care for yourself at home? Following the DASH diet  · Eat 4 to 5 servings of fruit each day. A serving is 1 medium-sized piece of fruit, ½ cup chopped or canned fruit, 1/4 cup dried fruit, or 4 ounces (½ cup) of fruit juice. Choose fruit more often than fruit juice. · Eat 4 to 5 servings of vegetables each day. A serving is 1 cup of lettuce or raw leafy vegetables, ½ cup of chopped or cooked vegetables, or 4 ounces (½ cup) of vegetable juice. Choose vegetables more often than vegetable juice. · Get 2 to 3 servings of low-fat and fat-free dairy each day. A serving is 8 ounces of milk, 1 cup of yogurt, or 1 ½ ounces of cheese. · Eat 6 to 8 servings of grains each day.  A serving is 1 slice of bread, 1 ounce of dry cereal, or ½ cup of cooked rice, pasta, or cooked cereal. Try to choose whole-grain products as much as possible. · Limit lean meat, poultry, and fish to 2 servings each day. A serving is 3 ounces, about the size of a deck of cards. · Eat 4 to 5 servings of nuts, seeds, and legumes (cooked dried beans, lentils, and split peas) each week. A serving is 1/3 cup of nuts, 2 tablespoons of seeds, or ½ cup of cooked beans or peas. · Limit fats and oils to 2 to 3 servings each day. A serving is 1 teaspoon of vegetable oil or 2 tablespoons of salad dressing. · Limit sweets and added sugars to 5 servings or less a week. A serving is 1 tablespoon jelly or jam, ½ cup sorbet, or 1 cup of lemonade. · Eat less than 2,300 milligrams (mg) of sodium a day. If you limit your sodium to 1,500 mg a day, you can lower your blood pressure even more. Tips for success  · Start small. Do not try to make dramatic changes to your diet all at once. You might feel that you are missing out on your favorite foods and then be more likely to not follow the plan. Make small changes, and stick with them. Once those changes become habit, add a few more changes. · Try some of the following:  ¨ Make it a goal to eat a fruit or vegetable at every meal and at snacks. This will make it easy to get the recommended amount of fruits and vegetables each day. ¨ Try yogurt topped with fruit and nuts for a snack or healthy dessert. ¨ Add lettuce, tomato, cucumber, and onion to sandwiches. ¨ Combine a ready-made pizza crust with low-fat mozzarella cheese and lots of vegetable toppings. Try using tomatoes, squash, spinach, broccoli, carrots, cauliflower, and onions. ¨ Have a variety of cut-up vegetables with a low-fat dip as an appetizer instead of chips and dip. ¨ Sprinkle sunflower seeds or chopped almonds over salads. Or try adding chopped walnuts or almonds to cooked vegetables. ¨ Try some vegetarian meals using beans and peas. Add garbanzo or kidney beans to salads. Make burritos and tacos with mashed gonzalez beans or black beans. Where can you learn more? Go to http://alex-adriane.info/. Enter C598 in the search box to learn more about \"DASH Diet: Care Instructions. \"  Current as of: March 23, 2016  Content Version: 11.2  © 7553-3572 [employer]. Care instructions adapted under license by [employer] (which disclaims liability or warranty for this information). If you have questions about a medical condition or this instruction, always ask your healthcare professional. [place] [address] any warranty or liability for your use of this information. Prediabetes: Care Instructions  Your Care Instructions  Prediabetes is a warning sign that you are at risk for getting type 2 diabetes. It means that your blood sugar is higher than it should be. The food you eat turns into sugar, which your body uses for energy. Normally, an organ called the pancreas makes insulin, which allows the sugar in your blood to get into your body's cells. But when your body can't use insulin the right way, the sugar doesn't move into cells. It stays in your blood instead. This is called insulin resistance. The buildup of sugar in the blood causes prediabetes. The good news is that lifestyle changes may help you get your blood sugar back to normal and help you avoid or delay diabetes. Follow-up care is a key part of your treatment and safety. Be sure to make and go to all appointments, and call your doctor if you are having problems. It's also a good idea to know your test results and keep a list of the medicines you take. How can you care for yourself at home? · Watch your weight. A healthy weight helps your body use insulin properly. · Limit the amount of calories, sweets, and unhealthy fat you eat. Ask your doctor if you should see a dietitian.  A registered dietitian can help you create meal plans that fit your lifestyle. · Get at least 30 minutes of exercise on most days of the week. Exercise helps control your blood sugar. It also helps you maintain a healthy weight. Walking is a good choice. You also may want to do other activities, such as running, swimming, cycling, or playing tennis or team sports. · Do not smoke. Smoking can make prediabetes worse. If you need help quitting, talk to your doctor about stop-smoking programs and medicines. These can increase your chances of quitting for good. · If your doctor prescribed medicines, take them exactly as prescribed. Call your doctor if you think you are having a problem with your medicine. You will get more details on the specific medicines your doctor prescribes. When should you call for help? Watch closely for changes in your health, and be sure to contact your doctor if:  · You have any symptoms of diabetes. These may include:  ¨ Being thirsty more often. ¨ Urinating more. ¨ Being hungrier. ¨ Losing weight. ¨ Being very tired. ¨ Having blurry vision. · You have a wound that will not heal.  · You have an infection that will not go away. · You have problems with your blood pressure. · You want more information about diabetes and how you can keep from getting it. Where can you learn more? Go to http://alex-adriane.info/. Enter I222 in the search box to learn more about \"Prediabetes: Care Instructions. \"  Current as of: May 23, 2016  Content Version: 11.2  © 6124-3673 [employer]. Care instructions adapted under license by [employer] (which disclaims liability or warranty for this information). If you have questions about a medical condition or this instruction, always ask your healthcare professional. [place] [address] any warranty or liability for your use of this information.

## 2017-05-05 NOTE — MR AVS SNAPSHOT
Visit Information Date & Time Provider Department Dept. Phone Encounter #  
 5/5/2017 10:30 AM Andreia Barrett, [address] [id number] Follow-up Instructions Return in about 7 months (around 12/5/2017), or if symptoms worsen or fail to improve. Your Appointments 6/29/2017  9:30 AM  
PRE OP with MD [facility] Appt Note: pre op 1212 [facility] [place] [address] [id number] [address] [place] [address] Upcoming Health Maintenance Date Due  
 PAP AKA CERVICAL CYTOLOGY 6/23/2019 DTaP/Tdap/Td series (2 - Td) 4/1/2023 Allergies as of 5/5/2017  Review Complete On: 5/5/2017 By: Andreia Barrett MD  
 No Known Allergies Current Immunizations  Never Reviewed Name Date Tdap 4/1/2013 Not reviewed this visit You Were Diagnosed With   
  
 Codes Comments Essential hypertension    -  Primary ICD-10-CM: I10 
ICD-9-CM: 401.9 Hypovitaminosis D     ICD-10-CM: E55.9 ICD-9-CM: 268.9 Borderline high cholesterol     ICD-10-CM: E78.9 ICD-9-CM: 272.9 Prediabetes     ICD-10-CM: R73.03 
ICD-9-CM: 790.29 BRIAN on CPAP     ICD-10-CM: G47.33, Z99.89 ICD-9-CM: 327.23, V46.8 Vitals BP Pulse Temp Resp Height(growth percentile) Weight(growth percentile) 138/88 (BP 1 Location: Left arm, BP Patient Position: Sitting) 88 98.1 °F (36.7 °C) (Oral) 16 5' 9\" (1.753 m) 333 lb (151 kg) SpO2 BMI OB Status Smoking Status 98% 49.18 kg/m2 Having regular periods Former Smoker BMI and BSA Data Body Mass Index Body Surface Area  
 49.18 kg/m 2 2.71 m 2 Preferred Pharmacy Pharmacy Name Phone [id number] [address] [place] [phone] Your Updated Medication List  
  
   
 This list is accurate as of: 5/5/17 10:59 AM.  Always use your most recent med list.  
  
  
  
  
 aspirin-acetaminophen-caffeine 250-250-65 mg per tablet Commonly known as:  EXCEDRIN ES Take 2 Tabs by mouth. Indications: PAIN  
  
 cholecalciferol (VITAMIN D3) 5,000 unit Tab tablet Commonly known as:  VITAMIN D3 Take  by mouth daily. hydroCHLOROthiazide 25 mg tablet Commonly known as:  HYDRODIURIL  
TAKE 1 TAB BY MOUTH DAILY. ibuprofen 200 mg tablet Commonly known as:  MOTRIN Take  by mouth. labetalol 200 mg tablet Commonly known as:  NORMODYNE  
TAKE 1 TABLET BY MOUTH TWICE A DAY  
  
 loratadine 10 mg tablet Commonly known as:  Industry Otter Take 1 Tab by mouth daily. multivitamin with iron tablet Take 1 Tab by mouth daily. Prescriptions Sent to Pharmacy Refills  
 hydroCHLOROthiazide (HYDRODIURIL) 25 mg tablet 2 Sig: TAKE 1 TAB BY MOUTH DAILY. Class: Normal  
 Pharmacy: [facility][id number] [place], [place] - [address] Ph #: [phone]  
 labetalol (NORMODYNE) 200 mg tablet 2 Sig: TAKE 1 TABLET BY MOUTH TWICE A DAY Class: Normal  
 Pharmacy: [facility]/pharmacy [address] [place] [address], [address] [place] [address],[address] [place] [address] Ph #: [phone] Follow-up Instructions Return in about 7 months (around 12/5/2017), or if symptoms worsen or fail to improve. To-Do List   
 05/25/2017 10:15 AM  
  Appointment with [facility] BARIATRIC DIETITIAN at [facility]  ([phone]) The appointment time noted includes the 15 minutes time frame for check in. Please do not arrive earlier than posted appointment time. Upon arrival report to the Conference Room located in [address] [place] [address], [address] [place] [address]. Patient Instructions DASH Diet: Care Instructions Your Care Instructions The DASH diet is an eating plan that can help lower your blood pressure. DASH stands for Dietary Approaches to Stop Hypertension. Hypertension is high blood pressure. The DASH diet focuses on eating foods that are high in calcium, potassium, and magnesium. These nutrients can lower blood pressure. The foods that are highest in these nutrients are fruits, vegetables, low-fat dairy products, nuts, seeds, and legumes. But taking calcium, potassium, and magnesium supplements instead of eating foods that are high in those nutrients does not have the same effect. The DASH diet also includes whole grains, fish, and poultry. The DASH diet is one of several lifestyle changes your doctor may recommend to lower your high blood pressure. Your doctor may also want you to decrease the amount of sodium in your diet. Lowering sodium while following the DASH diet can lower blood pressure even further than just the DASH diet alone. Follow-up care is a key part of your treatment and safety. Be sure to make and go to all appointments, and call your doctor if you are having problems. It's also a good idea to know your test results and keep a list of the medicines you take. How can you care for yourself at home? Following the DASH diet · Eat 4 to 5 servings of fruit each day. A serving is 1 medium-sized piece of fruit, ½ cup chopped or canned fruit, 1/4 cup dried fruit, or 4 ounces (½ cup) of fruit juice. Choose fruit more often than fruit juice. · Eat 4 to 5 servings of vegetables each day. A serving is 1 cup of lettuce or raw leafy vegetables, ½ cup of chopped or cooked vegetables, or 4 ounces (½ cup) of vegetable juice. Choose vegetables more often than vegetable juice. · Get 2 to 3 servings of low-fat and fat-free dairy each day. A serving is 8 ounces of milk, 1 cup of yogurt, or 1 ½ ounces of cheese. · Eat 6 to 8 servings of grains each day.  A serving is 1 slice of bread, 1 ounce of dry cereal, or ½ cup of cooked rice, pasta, or cooked cereal. Try to choose whole-grain products as much as possible. · Limit lean meat, poultry, and fish to 2 servings each day. A serving is 3 ounces, about the size of a deck of cards. · Eat 4 to 5 servings of nuts, seeds, and legumes (cooked dried beans, lentils, and split peas) each week. A serving is 1/3 cup of nuts, 2 tablespoons of seeds, or ½ cup of cooked beans or peas. · Limit fats and oils to 2 to 3 servings each day. A serving is 1 teaspoon of vegetable oil or 2 tablespoons of salad dressing. · Limit sweets and added sugars to 5 servings or less a week. A serving is 1 tablespoon jelly or jam, ½ cup sorbet, or 1 cup of lemonade. · Eat less than 2,300 milligrams (mg) of sodium a day. If you limit your sodium to 1,500 mg a day, you can lower your blood pressure even more. Tips for success · Start small. Do not try to make dramatic changes to your diet all at once. You might feel that you are missing out on your favorite foods and then be more likely to not follow the plan. Make small changes, and stick with them. Once those changes become habit, add a few more changes. · Try some of the following: ¨ Make it a goal to eat a fruit or vegetable at every meal and at snacks. This will make it easy to get the recommended amount of fruits and vegetables each day. ¨ Try yogurt topped with fruit and nuts for a snack or healthy dessert. ¨ Add lettuce, tomato, cucumber, and onion to sandwiches. ¨ Combine a ready-made pizza crust with low-fat mozzarella cheese and lots of vegetable toppings. Try using tomatoes, squash, spinach, broccoli, carrots, cauliflower, and onions. ¨ Have a variety of cut-up vegetables with a low-fat dip as an appetizer instead of chips and dip. ¨ Sprinkle sunflower seeds or chopped almonds over salads. Or try adding chopped walnuts or almonds to cooked vegetables. ¨ Try some vegetarian meals using beans and peas.  Add garbanzo or kidney beans to salads. Make burritos and tacos with mashed gonzalez beans or black beans. Where can you learn more? Go to http://alex-adriane.info/. Enter T070 in the search box to learn more about \"DASH Diet: Care Instructions. \" Current as of: March 23, 2016 Content Version: 11.2 © 3332-1861 [employer]. Care instructions adapted under license by [employer] (which disclaims liability or warranty for this information). If you have questions about a medical condition or this instruction, always ask your healthcare professional. [place] [address] any warranty or liability for your use of this information. Prediabetes: Care Instructions Your Care Instructions Prediabetes is a warning sign that you are at risk for getting type 2 diabetes. It means that your blood sugar is higher than it should be. The food you eat turns into sugar, which your body uses for energy. Normally, an organ called the pancreas makes insulin, which allows the sugar in your blood to get into your body's cells. But when your body can't use insulin the right way, the sugar doesn't move into cells. It stays in your blood instead. This is called insulin resistance. The buildup of sugar in the blood causes prediabetes. The good news is that lifestyle changes may help you get your blood sugar back to normal and help you avoid or delay diabetes. Follow-up care is a key part of your treatment and safety. Be sure to make and go to all appointments, and call your doctor if you are having problems. It's also a good idea to know your test results and keep a list of the medicines you take. How can you care for yourself at home? · Watch your weight. A healthy weight helps your body use insulin properly. · Limit the amount of calories, sweets, and unhealthy fat you eat. Ask your doctor if you should see a dietitian.  A registered dietitian can help you create meal plans that fit your lifestyle. · Get at least 30 minutes of exercise on most days of the week. Exercise helps control your blood sugar. It also helps you maintain a healthy weight. Walking is a good choice. You also may want to do other activities, such as running, swimming, cycling, or playing tennis or team sports. · Do not smoke. Smoking can make prediabetes worse. If you need help quitting, talk to your doctor about stop-smoking programs and medicines. These can increase your chances of quitting for good. · If your doctor prescribed medicines, take them exactly as prescribed. Call your doctor if you think you are having a problem with your medicine. You will get more details on the specific medicines your doctor prescribes. When should you call for help? Watch closely for changes in your health, and be sure to contact your doctor if: 
· You have any symptoms of diabetes. These may include: ¨ Being thirsty more often. ¨ Urinating more. ¨ Being hungrier. ¨ Losing weight. ¨ Being very tired. ¨ Having blurry vision. · You have a wound that will not heal. 
· You have an infection that will not go away. · You have problems with your blood pressure. · You want more information about diabetes and how you can keep from getting it. Where can you learn more? Go to http://alex-adriane.info/. Enter I222 in the search box to learn more about \"Prediabetes: Care Instructions. \" Current as of: May 23, 2016 Content Version: 11.2 © 5368-9820 [employer], [email or web address]. Care instructions adapted under license by [employer] (which disclaims liability or warranty for this information). If you have questions about a medical condition or this instruction, always ask your healthcare professional. [address] any warranty or liability for your use of this information. Introducing [facility]! Dear Iram Ku: Thank you for requesting a dbTwang account. Our records indicate that you already have an active dbTwang account. You can access your account anytime at [email or web address] Did you know that you can access your hospital and ER discharge instructions at any time in dbTwang? You can also review all of your test results from your hospital stay or ER visit. Additional Information If you have questions, please visit the Frequently Asked Questions section of the dbTwang website at [email or web address] Remember, dbTwang is NOT to be used for urgent needs. For medical emergencies, dial 911. Now available from your iPhone and Android! Please provide this summary of care documentation to your next provider. Your primary care clinician is listed as Alveda Day. If you have any questions after today's visit, please call [phone].

## 2017-05-25 ENCOUNTER — DOCUMENTATION ONLY (OUTPATIENT)
Dept: BARIATRICS/WEIGHT MGMT | Age: 37
End: 2017-05-25

## 2017-05-25 ENCOUNTER — HOSPITAL ENCOUNTER (OUTPATIENT)
Dept: BARIATRICS/WEIGHT MGMT | Age: 37
Discharge: HOME | End: 2017-05-25

## 2017-05-25 NOTE — PROGRESS NOTES
[address] [place] [address] [facility], [address]    Patient's Name: Abbie Phillips   Age: 44 y.o. YOB: 1980   Sex: female    Date:   5/25/2017    Insurance:            Session: 3 of 4  Revision:   Surgeon:  Dr. Rolando Neal    Height: 5 f9 Weight:    331      Lbs. BMI: 48.9   Pounds Lost since last month: 1               Pounds Gained since last month: 0    Starting Weight: 343   Previous Months Weight: 332  Overall Pounds Lost: 12 Overall Pounds Gained: 0      Do you smoke? None    Alcohol intake:  Number of drinks at a time:  None  Number of times a week: None    Class Guidelines    Patient understands that weight loss trial classes must be consecutive. Patient understands if they miss a class, it is their responsibility to contact me to reschedule class. Patient understands the expectations that weight maintenance/weight loss is expected during the classes. Failure to demonstrate changes may result in one extra month of weight loss trial, followed by going back to see the surgeon. Other Pertinent Information:     Changes Made Since Last Class: Still cutting down on carbohydrates. Eating Habits and Behaviors      Today we spent some time talking about the key diet principles. Patient was instructed to stop all liquid calories, including sweet tea, soda, fruit juices. We talked in class that 100 calories each day can amount to 10 pounds of weight gain in a year. In class, we also spent some time talking about carbohydrates. Patient was given examples of what equals 15 grams of carbohydrates including just a 1/3 of a cup of rice or pasta or a slice of bread. Patient was instructed to start cutting out bread, rice, pasta, cereal, and potatoes and emphasize on meat and vegetables only. Patient was also given a list of snack items that are protein-based. Patient's current diet habits include: 3 meals a per day. Snacking on nuts, cheese, and tangerines. She is eating bread, but states she is trying to stop. She states she is eating cookies, \"too often,\" but is trying to stop. She is drinking 64 ounces of water only per day. Physical Activity/Exercise    Comments:  During class, I discussed with patient the importance of getting into an exercise routine. We talked about how strength training can help one's metabolism  Currently, patient is not doing anything for activity. She states she had an injury and had a set back, but needs to get back into the gym ASAP. Goals have been set. I have encouraged patient to purchase a pedometer to track their steps. Behavior Modification       Comments: In class, I did a power point on Behavioral Changes and Weight Loss. This power point discussed that many of the habits we have were developed during childhood and that we get comfortable doing things, but in order to permanently get change on the scale, developing new habits will be very important. Some of the ways this can be achieved is by record keeping. This will help to observe eating habits and increase awareness of what, how much, and when they are eating. Patient was also asked to identify their eating triggers whether it is social, emotional, situational, or physical.      We also talked about behavior strategies, such as taking 20 minutes to eat. Leola Spina just enough for the meal and don't put serving dishes on the table. Practice leaving food on the plate rather than feeling the need to eat everything. Restrict locations of where they will eat to just 1-2 locations. Methods of success include keeping weight loss up, weighing 1 x a week, keeping track of carbohydrates, limiting certain food, write down everything that is consumed, and planning in advance.      Shirley Navarro Elvin 87 RD  5/25/2017

## 2017-06-19 ENCOUNTER — HOSPITAL ENCOUNTER (OUTPATIENT)
Dept: BARIATRICS/WEIGHT MGMT | Age: 37
Discharge: HOME | End: 2017-06-19

## 2017-06-19 ENCOUNTER — DOCUMENTATION ONLY (OUTPATIENT)
Dept: BARIATRICS/WEIGHT MGMT | Age: 37
End: 2017-06-19

## 2017-06-19 NOTE — PROGRESS NOTES
Nutrition Evaluation    Patient's Name: Jefferson Scott   Age: 44 y.o. YOB: 1980   Sex: female    Height: 5 f 9 Weight: 331 BMI:  48.9  Starting Weight:  343      Patient has completed a 4 month weight loss trial.  During the classes, we have focused on 3 components including diet, exercise, and behavior modifications. Upon completion of the weight loss trial, patient had a good understanding of the 3 components. The diet component of the weight loss trial emphasized on:   Label reading and keeping total fat and sugar less than 3 grams per serving    Proper portion sizes    Drinking 64 ounces of water per day   Cutting out simple sugar    The exercise component of the weight loss trial emphasized on:   The importance of getting into an exercise routine.  Ideas and goals for exercise were discussed with the patient. The behavior component of the weight loss trial emphasized on:   Taking 20-30 minutes to eat a meal.   Planning ahead to avoid making poor dietary choices.  Not eating in front of the TV or computer    Smoking Status:  None  Alcohol Intake:  Number of Drinks at a Time: Rarely  Number of Times a Week:     Changes made during classes include:  Food journaling  Consuming more water  Making healthier choices  Trying to avoid carbohydrates        Two things that patient learned during this weight loss trial:  Food journaling helps  Skipping meals is never the answer    Summary:  I feel that Jefferson Scott has demonstrated appropriate diet changes and is ready to move forward with surgery. Patient has been briefed on the importance of the protein drinks, vitamins, and the transition of the diet stages. Patient understands that the long-term diet will focus on protein and vegetables. Patient understand the effects of carbohydrates after surgery and what reactive hypoglycemia is.       Patient is aware that they will be attending pre-op class 2 weeks before surgery and will get more detailed information on the post-op diet guidelines. Patient will see me again at 6 weeks post-op. At this 6 week visit, RD will assess how patient is tolerating soft protein and advance to vegetables, if tolerating soft protein without difficulty. Patient will also see RD again at 9 months post-op. This visit will assess patient's compliance with current protocol, including diet, vitamins, protein shakes, and exercise. Post-op diet guidelines will be reinforced. RD is available for questions and to meet with patient outside of the 6 week and 9 month post-op visit.      Candidate for surgery: Yes  Re-evaluation Date:     Procedure:  Gastric Bypass     Shirley Martinez 87 RD  6/19/2017

## 2017-06-19 NOTE — PROGRESS NOTES
[address] [place] [address] [facility], [address]    Patient's Name: Mary Anne Salguero   Age: 44 y.o. YOB: 1980   Sex: female    Date:   6/19/2017    Insurance:            Session: 4 of 4  Revision:   Surgeon:  Dr. Vidal Mcconnell    Height: 5 f 9 Weight:    331      Lbs. BMI: 48.9   Pounds Lost since last month: 0               Pounds Gained since last month: 0    Starting Weight: 343   Previous Months Weight: 331  Overall Pounds Lost: 12 Overall Pounds Gained: 0      Do you smoke? None    Alcohol intake:  Number of drinks at a time:  1/2  Number of times a week:Very rarely    Class Guidelines    Guidelines are reviewed with patient at the start of every class. 1. Patient understands that weight loss trial classes must be consecutive. Patient understands if they miss a class, it is their responsibility to contact me to reschedule class. I will reach out to patient after their first no show. 2.  Patient understands the expectations that weight maintenance/weight loss is expected during the classes. Failure to demonstrate changes may result in one extra month of weight loss trial, followed by going back to see the surgeon. 3. Patient is also instructed to be doing their labs, blood work, psych visit, support group and any other test that the surgeon has used while they are working on their weight loss trial.    Other Pertinent Information:     Changes Made Since Last Class: Skipping sugary desserts. Trying to always hit 10,000 steps per day. Eating Habits and Behaviors      Today we spent some time talking about the key diet principles. Patient was given ideas of protein-based snacks including deli meat, low fat cheese, yogurt, hummus and vegetables, protein drink, or a small handful of nuts. Patient was instructed to start cutting out the empty carbohydrate-based snacks that include chips, cookies, pretzels, crackers.     We talked about carbohydrates and starting to count daily carbohydrate intake to keep less than 100 grams per day. Patient is encouraged to fill up on meat and vegetables only. Patient was given a goal of 64 ounces of fluid a day. This needs to be non-carbonated, no caffeine, and no sugary drinks. Patient's current diet habits include: 3 meals per day. States she is sometimes snacking in between meals on her car ride home. She is eating 1 serving of bread per day. She feels she is struggling the most with portions. She is drinking 64 ounces of water per day. She is cut out all liquid calories. Physical Activity/Exercise    Comments:  During class, I discussed with patient the importance of getting into an exercise routine. We talked about how strength training can help one's metabolism  Currently, patient is walking daily and aiming for 78[address] steps for activity. Goals have been set. I have encouraged patient to purchase a pedometer to track their steps. Behavior Modification       Comments: In class, I did a power point on The 100-200 Mindless Margin. Studies show that the average person will not know if they ate 100 or 200 more or less calories than usual.  This is called the Mindless Margin. In other words, one can eat up to 20% fewer calories and not activate the deprivation and craving mechanism in the brain. Patient was instructed to make a modest daily 100-200 calorie reduction in certain things that the body won't notice. One, 100-200 calorie change and you will lose 10-20 pounds in a year. We talked about strategies that may help including Food rules: I will not eat the whole dessert, but rather just 4 bites to satisfy me (pre-op). Patient was given a check off list with a month's worth of days across the top and was encouraged to come up with a food, exercise, and behavior goal.  Every evening if the goal was achieved, they get a check in the box.   The goal is for it to be filled with check marks. One 100-calorie change that the patient is going to make is: Cut out sweets completely.     Shirley Cordova Elvin 87 RD  6/19/2017

## 2017-06-20 ENCOUNTER — TELEPHONE (OUTPATIENT)
Dept: FAMILY MEDICINE CLINIC | Age: 37
End: 2017-06-20

## 2017-06-20 ENCOUNTER — DOCUMENTATION ONLY (OUTPATIENT)
Dept: SURGERY | Age: 37
End: 2017-06-20

## 2017-06-20 ENCOUNTER — DOCUMENTATION ONLY (OUTPATIENT)
Dept: BARIATRICS/WEIGHT MGMT | Age: 37
End: 2017-06-20

## 2017-06-20 NOTE — PROGRESS NOTES
CLINICAL NUTRITION PRE-OPERATIVE EDUCATION    Patient's Name: Willa Hugo   Age: 44 y.o. YOB: 1980   Sex: female    Education & Materials Provided:   x Excel Menu/Allowable Foods/ Shopping List   x  Supplemental Resource Guide: MVI, B12, Calcium Citrate, Vitamin D         recommendations  x  Protein Supplement Information  x  Fluid Requirements/ No Straws  x  No Caffeine or Carbonation   x  No alcohol for 1-Year Post-Op                     x  No Snacks or No Concentrated Sweets     x  Exercising   x  Support System at [facility] of Support Group meetings. Support System after surgery includes: x     x  Key Diet Principles            x  Addressed Current Habits/Changes to Make   x Patient has been educated on the liquid diet to begin 1 week prior to surgery. Attendance of support group: Yes  When:     Summary:  Patient has completed 4 visits with me. During these nutrition visits, we focused on dietary changes, behavior changes, and the importance of establishing an exercise routine. At today's session, patient was educated on the post-op diet protocol. Patient understands the importance of keeping total fat and sugar less than 3 grams per serving. Patient is aware of the transition of the diet stages and is aware that they will be on clear liquids for 7days, followed by soft protein for 5 weeks. Patient understands the body needs ~ 60-70 grams of protein per day. During the liquid phase, patient will need 60 grams of protein from shakes. Once eating soft protein, patient will only need 1 shake per day. Patient is aware of the importance of the vitamins and protein drinks. Patient has also been educated on the pre-op liquid diet, which will range from 1-2 weeks (depending on surgeon). Patient understands that failure to comply in pre-op liquid diet could result in surgery being canceled.       Patient's 6 week post-op nutrition appointment has been scheduled for: August 31 at 10 am.  Gail Hanna to proceed. At this 6 week visit, RD will assess how patient is tolerating soft protein and advance to vegetables, if tolerating soft protein without difficulty. Patient will also see RD again at 9 months post-op. This visit will assess patient's compliance with current protocol, including diet, vitamins, protein shakes, and exercise. Post-op diet guidelines will be reinforced. RD is available for questions and to meet with patient outside of the 6 week and 9 month post-op visit.         Candidate for surgery: Yes  Re-evaluation Date:     Shirley Niño 87 RD  6/20/2017

## 2017-06-20 NOTE — TELEPHONE ENCOUNTER
Glenn Cooper from the surgical dept states that they will need a Letter stating that the Dr recommends that the pt have bariatric surgery. She states that it can be a letter or a office note that might state that fact.  please Fax [phone] attn Glenn Cooper

## 2017-06-20 NOTE — PROGRESS NOTES
7 day Pre-Op LIQUID Diet    Benefits:  o Reducing intake before surgery will shrink your liver by  depleting glycogen. (a form of stored energy)  o Reduced liver size gives better access to stomach during  surgery; which translates to a safer surgery. o Prevents the \"last supper\" syndrome  o Experiencing weight loss before the procedure encourages  post-operative compliance and \"jump-starts\" weight loss    Specifics:  o Start 7 days before surgery:  ____7/17/17________  o NO SOLID FOODS!!  o Your surgery will be CANCELED if this diet is not followed!!!  o Minimum of 64oz. of fluid daily, including protein drinks.  o No added sugar or carbonated beverages  o Continue to take all your prescribed medication and your vitamin supplements during this preoperative diet phase. CLEAR LIQUIDS:   Water   Sugar free, non-carbonated beverages (crystal light, propel)   Sugar free popsicles   Sugar free Jell-O   Fat free, reduced sodium broth    Decaffeinated coffee or decaffeinated tea with artificial sweeteners. PROTEIN:   60 grams of protein daily (in liquid supplements)   Pre-made protein drinks   Protein powder added to water    3 gram rule: limit sugar and fat to less than 3 grams per 8oz.  4-6 oz. low fat/low sugar yogurt OR cottage cheese 3-4 times during the week      [facility] Gastric Bypass and Sleeve Dietary Progression  Patient Name:   Date of Surgery: 7/24/17      Ice Chips start once admitted on floor. Begin Bariatric Clear Liquid Diet on: 7/25/17    Clear Liquid Diet: 64 oz. of fluid per day  o Low calorie, low sugar, non-carbonated beverages  - Water, Crystal Light, Propel Water, Sugar Free Jell-O, Sugar Free Popsicles, Bouillon  - Start protein supplement during this stage. (60-70 grams per day)  - Getting your fluid in and staying hydrated is your #1 priority! - The clear liquid diet will last for 7 days.       Begin Bariatric Soft and Moist on: 8/1/17  - This stage of the diet will last for 5 week, unless otherwise instructed by your surgeon. - Begin:  1 week post-op   - End:  6 weeks post-op (or when you follow up with the Registered Dietitian)    - Soft, moist, high protein foods: 3 meals per day plus protein supplements. o   Portions should emphasize on soft protein. o Portions will be a MAXIMUM of 1 ounce for solid food and 2-3 ounces for cottage cheese and yogurt. o Protein supplements should be between meals and provide 30-40 grams per day during soft protein diet. o Continue to get 64 ounces of fluid in per day. - Protein foods that would be ok on the Soft and Moist Diet are:  o Slow transition:  o 1st week on soft protein should focus on: Yogurt, cottage cheese, eggs  o 2nd -4th  week on soft protein diet should focus on: yogurt, cottage cheese, eggs, canned tuna, canned chicken, tilapia, fish (needs to be soft enough to be cut up with a fork)  o 5th week on soft protein diet should focus on: Yogurt, cottage cheese, eggs, canned tuna, canned chicken, tilapia, fish, salmon, chicken breast, or turkey. Remember to continue to get 64 ounces of fluid daily on ALL Stages. To be advanced to Bariatric Maintenance Stage of the bariatric diet, follow up with the dietitian 6 weeks post-op, around:        Sravan Menezes 54  1. How many ounces of fluid will you need to drink every day after surgery? _______________    2. List 3 examples of bariatric clear liquids. ________________________  ________________________  ________________________  3. What is the 3 gram rule?   ______________________________________________________________      4. What is the 30 minute rule?  ______________________________________________________________      5. What are examples of food you will be able to eat on the bariatric soft and moist diet?  _________________________  _________________________  _________________________  6. After surgery I will be able to use a straw.   TRUE FALSE    7. After surgery I will NOT be able to drink carbonated beverages. TRUE    FALSE  8. After surgery I will be able to drink caffeine. TRUE   FALSE    9. What 4 vitamins will you take after surgery?  ______________________           _________________________  _____________________           _________________________  10. How much exercise should you get daily? _________________    11. How long should it take you to eat a meal? ________________    12. The Calcium I have purchased is: ______________________. This has ________ mg per serving. I will need to take this ________ times a day. 13. The protein drink I have decided on is: ______________. This has _________ grams of protein. I will need to drink ______ of my protein drinks during the full liquid phase and _____ during the soft protein phase. My protein drinks will give me ______ ounces of fluid. 14. After having a sleeve gastrectomy I will not be able to take NSAIDs (Non-Steroidal Anti-inflammatory Drugs) for ______ weeks. 15. After having a gastric bypass I will not be able to take NSAIDs (Non-Steroidal Anti-Inflammatory Drugs) for _____________. Pre-op instructions:  1. Shower with the antibacterial soap provided the 3 days prior to surgery. See instructions for details. 2. Pre-admission testing will contact you 1 week before surgery  3. [facility] will contact you the day before surgery to confirm your surgery time.  Email or call your surgery scheduler if you have not heard from them by 3pm  4. Nothing to eat or drink (NPO) after midnight the night before surgery.  Take any evening medications by 11pm  5. Wear comfortable clothing. 6. Do not bring any valuables. 7. Bring insurance card, prescription card, photo ID and credit card to the hospital.  **Discuss all home medications with your surgeon at you pre-op appointment.   Your surgeon will inform you of what medications to stop before surgery and what medications to take the day of surgery. **STOP all NSAIDS (Ibuprofen, Aleve, Advil, Naprosyn etc.) and Aspirin 7 days before your surgery      Important Information:  1. No lifting over 15 lbs. for 6 weeks post-op  2. No driving for 1-45 days after surgery - must be off pain medication. 3. No smoking EVER again! 4. You will NOT be able to take any Non-Steroidal Anti-inflammatories (NSAIDS), Aspirin or Steroids  a. Bypass/revision patients - EVER again. (Tylenol only)  b. Sleeve patients - 6 weeks after surgery  5. Pulse/temperature- twice daily for 2 weeks post-op   6. Avoid pregnancy for 18 months  7. Key Diet principles:  a. Vitamin/mineral supplements-[place] Complete, Calcium citrate, Vitamin D3, Vitamin B12  b.  Protein supplements - 60-70 grams/day  c. Minimum 64 oz. fluid per day      What to Expect in the Hospital:  Pre-op area:  o 2nd floor   o Arrive 2 hours before surgery  o Lovenox (blood thinner)  o Incentive Spirometer  o SCDs  o Sign consent  o Anesthesia  Start IV    Operating Room:    o Gastric bypass: 2- 2 ½ hours  o Sleeve gastrectomy: 1-1 ½ hours  o Revision: 2-3 hours    PACU(Post Anesthesia Care Unit):  o Nasal cannula  o EKG monitor  o Blood pressure cuff  o Pulse Ox  o Norris Catheter  o SCDs  o No family allowed  o Minimum one hour      5 [place] (Day of Surgery):  o Private room  o 1-2 oz. ice chips per hour (except Chastanet patients-mouth swabs only)  o IV Dilaudid  or liquid pain medication as needed for pain  o Discontinue norris catheter  o Walk in the evening  o One family member can spend the night (must 18 years or older)  o Cell phone/computers - OK    5 [place] (Post-op Day 1/Post-op Day 2):  o 7am - Gastrograffin swallow study (X-ray) for:  o All sleeve gastrectomy patients  o All Dr. Santos Gonsalez patients  o All revision patients   o Discontinue -nasal cannula and heart rate monitor  o Start bariatric clear liquid diet - water, crystal light, broth, Jell-O and protein supplement  o Slowly increase to 3 oz. clear liquids and 1 oz. protein supplement per hour  o Oral pain medication as needed for pain - communicate with your nurse  o Goal:  48oz. liquid per day  o Discharge instructions/Lovenox self-injection instructions (if prescribed)  o WALK!!    Post-op Goals:  1. Void within 8 hours of your catheter being removed  2. Pain is well controlled with oral pain medication  3. Nausea is under control/No vomiting  4. Tolerating 3 oz. of clear liquids and 1 oz. protein supplement per hour for 3 consecutive hours. Post-op Follow-up Labs will need to be completed 1-2 weeks BEFORE your 3 month, 6 month and yearly visits. These labs are required and your appointment will be rescheduled if they are not completed. My surgical procedure and post-operative hospital stay has been discussed with me and I have been given the opportunity to ask any questions I may have. Patient Signature: ____________________________  Date: ________    Following Gastric Bypass surgery you will no longer be able to take NSAIDs (Non-Steriodal Anti-Inflammatory Drugs) EVER again. NSAIDs are the leading cause of stomach ulcers following Gastric Bypass surgery. (Patients having the Gastric Sleeve will not be able to take NSAIDs for 6 weeks following surgery.)      MOST COMMONLY TAKEN    NSAIDs    to be AVOIDED!! 1. Ibuprofen  2. Advil  3. Motrin  4. Excedrin  5. Aspirin  6. Celebrex  7. Naproxen  8. Aleve  9. Voltaren  10. Mobic    Attached is an extensive list of additional NSAIDs that cannot be taken following surgery. Please be sure to review this list when you are being prescribed new medications. This list covers the majority of NSAIDs on the market. Be aware that additional NSAIDs do exist and new medications are being introduced regularly. You must be your own advocate!! Non-Steriodal Anti-Inflammatory Drugs (NSAIDs)  ?  The following is a list of NSAIDS that are NEVER to be taken again after Gastric Bypass surgery    Ibuprofen which is also known as : Advil, Advil Childrens, Advil Chris Strength, Advil Liquigel, Advil Migraine, Advil Pediatric, Childrens Ibuprofen Berry, Genpril, IBU, Midol IB, Midol Maximum Strength Cramp Formula, Dolgesic, Motrin Childrens, Motrin IB, Motrin Infant Drops, Motrin Chris Strength, Motrin Migraine Pain, Nuprin, Migraine Liqui-gels, Ibu-Tab 200, Cap-Profen, Tab-Profen, Profen, Ibuprohm, Childrens Elixsure, IB Pro, Vicoprofen, Combunox, A-G Profen, Actiprofen, Addaprin, Advil Infants Concentrated Drops, Caldolor, [place], Q-Profen, Ibifon 600, Ibren, Kinsey, Midol Cramps & Bodyaches, [place], [place]ota, Hinson Abd[place], [place], Uni-Pro, Wal-Profen    Aspirin which has the brand name: Arthritis Pain, Aspergum, Aspir-Low, Aspirin Lite Coat, Heidy Aspirin, Bufferin, Easprin, Ecotrin, Empirin, Fasprin, [place], Halfprin, Jenner Aspirin, [place] Aspirin, Excedrin    Ketoprofen which is known as: Orudis KT, Oruvail, Actron. Sulindac which is sold as: Clinoril. Naproxen is one of the most common NSAIDs, and is sold as: Aleve, Naprosyn, EC-Naprosyn, Naprelan, Anaprox, All Day Pain Relief, Aflaxen, All Day Relief, Anaprox-DS, Comfort Pac  With Naproxen,  Aleve Caplet, Aleve Easy Open Arthritis, Aleve Gelcap,  Anaprox-DS, EC-Naprosyn, Leader Naproxen Sodium, Midol Extended Relief, Naprelan 375?, Naprelan 500?, Naprelan 750?, Prevacid NapraPac 375,  Prevacid NapraPac, Naprapac, Vimovo. Etodolac is sold under the brand name: Lodine XL, Lodine. Fenoprofen can be found on the market as: Nalfon, Nalfon 200. Diclofenac sold as: Arthrotec, Cataflam, Voltaren, Cambia, Voltaren-XR, Zipsor. Flurbiprofen sold as: Asaid. Ketorolac is sold under the brand name: Sprix, Toradol, Toradol IM, Toradol IV/IM. Piroxicam is found on the market as: Feldene. Indomethacin known as: Indocin SR, Indocin, Indo-Janee, Indomethegan, Indocin IV.     Mefenamic Acid sold as: Ponstel. Meloxicam is sold under the brand name: Mobic    Nabumetone which is sold as: Relafen. Oxaprozin which has the brand name: Daypro    Ketoprofen which has the brand name: Actron, Orudis KT, Orudis, Oruvail    Famotidine and Ibuprofen can be found as: Duexis    Meclofenamate sold as: Meclomen    Tolmetin which can be found on the marked as: Tolectin, Tolectin 600, Tolectin DS    Salsalate which is sold as: Disalcid. Celecoxib which is sold as: Celebrex    PRE-OP CHECKLIST    THINGS TO DO AT MY PRE-OP APPOINTMENT WITH MY SURGEON:  ? Discuss ALL my current medications with my surgeon. Know what medications needs to be stopped before surgery AND what medications need to be taken on the morning of surgery. ? blood pressure/diuretic medications. ? Coumadin, Plavix or other blood thinners    ? Stop the following diabetic medications (if applicable): ____________________________________________________on: ______________  ? Use Regular Insulin (Novolog Pen) according to the following sliding scale: Insulin Sliding Scale  Blood sugar:  Amount of insulin:  Under 150  no insulin  150-200  2 units  201-250  4 units  251-300  6 units  301-350  8 units  351-400  10 units  400 or greater 12 units and call physician [phone]    THINGS TO DO FOLLOWING the PRE-OP CLASS:  ? Read through all information given to me by:  ? My dietitian Jet Dodson or Christian  ? Domitila/Mera at the Pre-op class    ? Contact my insurance company to find out the out of pocket cost of Lovenox (enoxaparin). $____________      THINGS TO DO BEFORE SURGERY:    ? Start the pre-op liquid diet on: ___7/17/17________    ? Stop all NSAIDS (see attached sheet with list of NSAIDs) and Aspirin 7 days before my surgery: ___7/17/17________  ? Contact my doctor(PCP or surgeon) regarding stopping Coumadin, Plavix or other blood thinners    ?  Purchase bariatric clear liquids (Crystal Lite, sugar free Jell-O, broth, sugar free popsicles, protein supplement) and bariatric soft and moist foods (low fat yogurt, cottage cheese, eggs, tuna, fish, chicken) so that Im prepared when I get home from the hospital.     ? Purchase all vitamins that will be required following surgery. 1. Chewable multivitamin - 2 per day(ex: Flintstones)  2. Calcium Citrate - 1500mg (500mg 3 times a day)  3. Vitamin B12 - 1000mcg daily  4. Vitamin D3 - 5000IU daily    ? Create a system to keep track of how many ounces of fluid I am drinking daily. ? Post-op GOAL: 64oz per day    ? Purchase a protein supplement that I like:  ? Brand: ______________    ? Ounces: __________   ?  Grams of protein per serving: _________

## 2017-06-29 ENCOUNTER — OFFICE VISIT (OUTPATIENT)
Dept: SURGERY | Age: 37
End: 2017-06-29

## 2017-06-29 VITALS
BODY MASS INDEX: 43.4 KG/M2 | HEIGHT: 69 IN | OXYGEN SATURATION: 98 % | RESPIRATION RATE: 16 BRPM | WEIGHT: 293 LBS | TEMPERATURE: 97.4 F | HEART RATE: 96 BPM | DIASTOLIC BLOOD PRESSURE: 90 MMHG | SYSTOLIC BLOOD PRESSURE: 140 MMHG

## 2017-06-29 DIAGNOSIS — Z99.89 OSA ON CPAP: ICD-10-CM

## 2017-06-29 DIAGNOSIS — E66.01 MORBID OBESITY DUE TO EXCESS CALORIES (HCC): Primary | ICD-10-CM

## 2017-06-29 DIAGNOSIS — G47.33 OSA ON CPAP: ICD-10-CM

## 2017-06-29 DIAGNOSIS — E55.9 HYPOVITAMINOSIS D: ICD-10-CM

## 2017-06-29 DIAGNOSIS — N39.3 SUI (STRESS URINARY INCONTINENCE, FEMALE): ICD-10-CM

## 2017-06-29 DIAGNOSIS — I10 ESSENTIAL HYPERTENSION: ICD-10-CM

## 2017-06-29 NOTE — LETTER
6/29/2017 9:49 AM 
 
Patient:  Steven Avila YOB: 1980 Date of Visit: 6/29/2017 Samm Williamson, [address]in [address] [id number] [place] [address] VIA In Basket Dear Samm Williamson MD, Thank you for referring Ms. Steven Avila to [place] [address] for evaluation and treatment. Below are the relevant portions of my assessment and plan of care. Preop History and Physical Exam: 
 
Steven Avila is a 44 y.o. female who comes into the office today after completing the entirety of the bariatric preoperative protocol satisfactorily. she initially identified obesity at the age of 24 and at age 24 weighed 175lbs. she has tried a variety of unsupervised weight-loss attempts, but has yet to meet with lasting success. Today, the patient is Height: 5' 9\" (175.3 cm) tall, Weight: 150.6 kg (332 lb) lbs for a Body mass index is 49.03 kg/(m^2). It is due to their severe obesity, which is further complicated by hypertension, hyperlipidemia, obstructive sleep apnea - clinical, weight related arthopathies and hypovitaminosis D  that the patient is now seeking out bariatric surgery, specifically, the gastric bypass Past Medical History:  
Diagnosis Date  ADD (attention deficit disorder) 14 y/o   
 d/hanny meds 23 y/o  Cyst of left breast 3/15  
  6 month  for ff-up 9/15  Endometriosis  HTN (hypertension) 3/4/2010  Irregular menses  Kidney stone 2007  Migraine 3/4/2010  
 prn excedrine migraine OTC, trigger heat/dehydration  Morbid obesity ([address]  BRIAN on CPAP   
 Pelvic cramping  Prediabetes Past Surgical History:  
Procedure Laterality Date  HX GYN  7/2002  
 endometriosis, lysis of adhesions d and c  
 
 
Current Outpatient Prescriptions Medication Sig Dispense Refill  hydroCHLOROthiazide (HYDRODIURIL) 25 mg tablet TAKE 1 TAB BY MOUTH DAILY.  90 Tab 2  
  labetalol (NORMODYNE) 200 mg tablet TAKE 1 TABLET BY MOUTH TWICE A  Tab 2  cholecalciferol, VITAMIN D3, (VITAMIN D3) 5,000 unit tab tablet Take  by mouth daily.  multivitamin with iron tablet Take 1 Tab by mouth daily.  loratadine (CLARITIN) 10 mg tablet Take 1 Tab by mouth daily. 30 Tab 11 No Known Allergies Social History Substance Use Topics  Smoking status: Former Smoker Packs/day: 0.25 Quit date: 8/1/2002  Smokeless tobacco: Never Used Comment: started 20y/o  Alcohol use Yes Comment: states rare Family History Problem Relation Age of Onset  Alcohol abuse Mother  Ovarian Cancer Mother 54  
 Headache Mother  Osteoporosis Mother  Headache Father  Hypertension Father  Diabetes Father  Osteoporosis Maternal Grandmother  Cancer Maternal Grandmother  Hypertension Maternal Grandfather  Headache Paternal Grandmother  Arthritis-osteo Paternal Grandmother  Diabetes Paternal Grandfather  Stroke Paternal Grandfather Family Status Relation Status  Mother  Father  Maternal Grandmother  Maternal Grandfather  Paternal Grandmother  Paternal Grandfather Review of Systems:  Positive in BOLD 
  
CONST: Fever, weight loss, fatigue or chills GI: Nausea, vomiting, abdominal pain, change in bowel habits, hematochezia, melena, and GERD INTEG: Dermatitis, abnormal moles HEENT: Recent changes in vision, vertigo, epistaxis, dysphagia and hoarseness CV: Chest pain, palpitations, HTN, edema and varicosities RESP: Cough, shortness of breath, wheezing, hemoptysis, snoring and reactive airway disease : Hematuria, dysuria, frequency, urgency, nocturia and stress urinary incontinence MS: Weakness, joint pain and arthritis ENDO: Pre-Diabetes, thyroid disease, polyuria, polydipsia, polyphagia, poor wound healing, heat intolerance, cold intolerance LYMPH/HEME: Anemia, bruising and history of blood transfusions NEURO: Dizziness, headache, fainting, seizures and stroke PSYCH: Anxiety and depression Physical Exam 
 
Visit Vitals  Resp 16  
 Ht 5' 9\" (1.753 m)  Wt 150.6 kg (332 lb)  LMP 06/18/2017  BMI 49.03 kg/m2 General: Well developed, well nourished 44 y.o.) female in no acute distress - gynecoid morbid obesity Head: Normocephalic, atraumatic Mouth: Clear, no overt lesions, oral mucosa pink and moist 
Neck: Supple, no masses, no adenopathy or carotid bruits, trachea midline Resp: Clear to auscultation bilaterally, now wheeze, rhonchi, or rales, excursions normal and symmetrical 
Cardio: Regular rate and rhythm, no murmurs, clicks, gallops, or rubs. No edema or varicosities. Abdomen: Obese, soft, nontender, nondistended, normoactive bowel sounds, no hernias, no hepatosplenomegaly, easily palpable costal margins, gynecoid distribution Extremities: Warm, well perfused, no tenderness or swelling, normal gait/station, healed scar to right lower leg Neuro: Sensation and strength grossly intact and symmetrical 
Psych: Alert and oriented to person, place, and time. Studies: LABS: within normal limits except for hypovitaminosis D 
EKG: without significant abnormalities NUTRITIONAL EVALUATION has deemed her a good candidate for weight loss surgery PSYCH EVAL: deems her a good candidate Impression: 
 
Jose Manuel Green is a 44 y.o. female who is suffering from morbid obesity and comorbidities including hypertension, hyperlipidemia, obstructive sleep apnea - clinical and weight related arthopathieswho would benefit from bariatric surgery. We've discussed the restrictive and malabsorptive nature of the gastric bypass. The patient understands the likelihood of losing approximately 80% of their excess weight in 12 to 18 months.  She also understands the risks including but not limited to bleeding, infection, need for reoperation, anastomotic ulcers, leaks and strictures, bowel obstruction secondary to adhesions and internal hernias, DVT, PE, heart attack, stroke, and death. Patient also understands risks of inadequate weight loss, excess weight loss, vitamin insufficiency, protein malnutrition, excess skin, and loss of hair. We have reviewed the components of a successful postoperative course including requirement for a high protein, low carbohydrate diet, 60 oz a day of zero calorie liquids, daily vitamin supplementation, daily exercise, regular follow-up, and participation in support groups. We have reviewed the preoperative liver shrinking clear liquid diet, as well as reviewed any medication changes required while on the clear liquid diet. In addition, the patient understands that all medications to be taken during the first 8 weeks postoperatively are to be crushed and must therefore be in immediate release formulations. They further understand that it is their responsibility to review these and verify with their primary care doctor and pharmacist that all medications are crushable. We will schedule them for laparoscopic gastric bypass without liver biopsy in the near future. Thank you very much for your referral of Ms. Warren Astorga. If you have questions, please do not hesitate to call me. I look forward to following MsJohn Jack Munguia along with you and will keep you updated as to her progress.   
 
 
 
 
Sincerely, 
 
 
Jaciel Villasenor MD

## 2017-06-29 NOTE — PROGRESS NOTES
Preop History and Physical Exam:    Abbie Phillips is a 44 y.o. female who comes into the office today after completing the entirety of the bariatric preoperative protocol satisfactorily. she initially identified obesity at the age of 24 and at age 24 weighed 175lbs. she has tried a variety of unsupervised weight-loss attempts, but has yet to meet with lasting success. Today, the patient is Height: 5' 9\" (175.3 cm) tall, Weight: 150.6 kg (332 lb) lbs for a Body mass index is 49.03 kg/(m^2). It is due to their severe obesity, which is further complicated by hypertension, hyperlipidemia, obstructive sleep apnea - clinical, weight related arthopathies and hypovitaminosis D  that the patient is now seeking out bariatric surgery, specifically, the gastric bypass      Past Medical History:   Diagnosis Date    ADD (attention deficit disorder) 14 y/o     d/hanny meds 25 y/o    Cyst of left breast 3/15     6 month  for ff-up 9/15    Endometriosis     HTN (hypertension) 3/4/2010    Irregular menses     Kidney stone 2007    Migraine 3/4/2010    prn excedrine migraine OTC, trigger heat/dehydration    Morbid [address] ([facility] [address]     BRIAN on CPAP     Pelvic cramping     Prediabetes        Past Surgical History:   Procedure Laterality Date    HX GYN  7/2002    endometriosis, lysis of adhesions d and c       Current Outpatient Prescriptions   Medication Sig Dispense Refill    hydroCHLOROthiazide (HYDRODIURIL) 25 mg tablet TAKE 1 TAB BY MOUTH DAILY. 90 Tab 2    labetalol (NORMODYNE) 200 mg tablet TAKE 1 TABLET BY MOUTH TWICE A  Tab 2    cholecalciferol, VITAMIN D3, (VITAMIN D3) 5,000 unit tab tablet Take  by mouth daily.  multivitamin with iron tablet Take 1 Tab by mouth daily.  loratadine (CLARITIN) 10 mg tablet Take 1 Tab by mouth daily.  30 Tab 11       No Known Allergies    Social History   Substance Use Topics    Smoking status: Former Smoker     Packs/day: 0.25     Quit date: 8/1/2002    Smokeless tobacco: Never Used      Comment: started 20y/o    Alcohol use Yes      Comment: states rare       Family History   Problem Relation Age of Onset    Alcohol abuse Mother     Ovarian Cancer Mother 54    Headache Mother     Osteoporosis Mother     Headache Father     Hypertension Father     Diabetes Father     Osteoporosis Maternal Grandmother     Cancer Maternal Grandmother     Hypertension Maternal Grandfather     Headache Paternal Grandmother     Arthritis-osteo Paternal Grandmother     Diabetes Paternal Grandfather     Stroke Paternal Grandfather      Family Status   Relation Status    Mother     Father     Maternal Grandmother     Maternal Grandfather     Paternal Grandmother     Paternal Grandfather        Review of Systems:  Positive in BOLD     CONST: Fever, weight loss, fatigue or chills  GI: Nausea, vomiting, abdominal pain, change in bowel habits, hematochezia, melena, and GERD   INTEG: Dermatitis, abnormal moles  HEENT: Recent changes in vision, vertigo, epistaxis, dysphagia and hoarseness  CV: Chest pain, palpitations, HTN, edema and varicosities  RESP: Cough, shortness of breath, wheezing, hemoptysis, snoring and reactive airway disease  : Hematuria, dysuria, frequency, urgency, nocturia and stress urinary incontinence   MS: Weakness, joint pain and arthritis  ENDO: Pre-Diabetes, thyroid disease, polyuria, polydipsia, polyphagia, poor wound healing, heat intolerance, cold intolerance  LYMPH/HEME: Anemia, bruising and history of blood transfusions  NEURO: Dizziness, headache, fainting, seizures and stroke  PSYCH: Anxiety and depression    Physical Exam    Visit Vitals    Resp 16    Ht 5' 9\" (1.753 m)    Wt 150.6 kg (332 lb)    LMP 06/18/2017    BMI 49.03 kg/m2         General: Well developed, well nourished 44 y.o.) female in no acute distress - gynecoid morbid obesity  Head: Normocephalic, atraumatic  Mouth: Clear, no overt lesions, oral mucosa pink and moist  Neck: Supple, no masses, no adenopathy or carotid bruits, trachea midline  Resp: Clear to auscultation bilaterally, now wheeze, rhonchi, or rales, excursions normal and symmetrical  Cardio: Regular rate and rhythm, no murmurs, clicks, gallops, or rubs. No edema or varicosities. Abdomen: Obese, soft, nontender, nondistended, normoactive bowel sounds, no hernias, no hepatosplenomegaly, easily palpable costal margins, gynecoid distribution  Extremities: Warm, well perfused, no tenderness or swelling, normal gait/station, healed scar to right lower leg  Neuro: Sensation and strength grossly intact and symmetrical  Psych: Alert and oriented to person, place, and time. Studies:    LABS: within normal limits except for hypovitaminosis D  EKG: without significant abnormalities  NUTRITIONAL EVALUATION has deemed her a good candidate for weight loss surgery  PSYCH EVAL: deems her a good candidate    Impression:    Warren Astorga is a 44 y.o. female who is suffering from morbid obesity and comorbidities including hypertension, hyperlipidemia, obstructive sleep apnea - clinical and weight related arthopathieswho would benefit from bariatric surgery. We've discussed the restrictive and malabsorptive nature of the gastric bypass. The patient understands the likelihood of losing approximately 80% of their excess weight in 12 to 18 months. She also understands the risks including but not limited to bleeding, infection, need for reoperation, anastomotic ulcers, leaks and strictures, bowel obstruction secondary to adhesions and internal hernias, DVT, PE, heart attack, stroke, and death. Patient also understands risks of inadequate weight loss, excess weight loss, vitamin insufficiency, protein malnutrition, excess skin, and loss of hair.   We have reviewed the components of a successful postoperative course including requirement for a high protein, low carbohydrate diet, 60 oz a day of zero calorie liquids, daily vitamin supplementation, daily exercise, regular follow-up, and participation in support groups. We have reviewed the preoperative liver shrinking clear liquid diet, as well as reviewed any medication changes required while on the clear liquid diet. In addition, the patient understands that all medications to be taken during the first 8 weeks postoperatively are to be crushed and must therefore be in immediate release formulations. They further understand that it is their responsibility to review these and verify with their primary care doctor and pharmacist that all medications are crushable. We will schedule them for laparoscopic gastric bypass without liver biopsy in the near future.

## 2017-06-29 NOTE — PROGRESS NOTES
Pt ID confirmed    Weight Loss Metrics 6/29/2017 6/29/2017 5/5/2017 5/4/2017 5/4/2017 3/3/2017 3/3/2017   Pre op / Initial Wt 332 - - 333 - 343 -   Today's Wt - 332 lb 333 lb - 333 lb - 343 lb   BMI - 49.03 kg/m2 49.18 kg/m2 - 49.18 kg/m2 - 50.65 kg/m2   Ideal Body Wt 146 - - 146 - 146 -   Excess Body Wt 186 - - 187 - 197 -   Goal Wt 183 - - 183 - 185 -   Wt loss to date 0 - - 0 - 0 -   % Wt Loss 0 - - 0 - 0 -   80% .8 - - 149.6 - 157.6 -       Body mass index is 49.03 kg/(m^2).

## 2017-06-29 NOTE — PATIENT INSTRUCTIONS
Take the following medications as noted. - If the medication is circled, take with a sip of water on the morning of surgery prior to reporting to the hospital  - If the medication has a line drawn through it, do not take that medication after starting your liquid diet before surgery  - If the medication has a star beside it, change its dosing as written beside it on the date written beside it. Current Outpatient Prescriptions   Medication Sig Dispense Refill    hydroCHLOROthiazide (HYDRODIURIL) 25 mg tablet TAKE 1 TAB BY MOUTH DAILY. 90 Tab 2    labetalol (NORMODYNE) 200 mg tablet TAKE 1 TABLET BY MOUTH TWICE A  Tab 2    cholecalciferol, VITAMIN D3, (VITAMIN D3) 5,000 unit tab tablet Take  by mouth daily.  multivitamin with iron tablet Take 1 Tab by mouth daily.  loratadine (CLARITIN) 10 mg tablet Take 1 Tab by mouth daily.  30 Tab 11

## 2017-06-29 NOTE — COMMUNICATION BODY
Preop History and Physical Exam:    Lena Gage is a 44 y.o. female who comes into the office today after completing the entirety of the bariatric preoperative protocol satisfactorily. she initially identified obesity at the age of 24 and at age 24 weighed 175lbs. she has tried a variety of unsupervised weight-loss attempts, but has yet to meet with lasting success. Today, the patient is Height: 5' 9\" (175.3 cm) tall, Weight: 150.6 kg (332 lb) lbs for a Body mass index is 49.03 kg/(m^2). It is due to their severe obesity, which is further complicated by hypertension, hyperlipidemia, obstructive sleep apnea - clinical, weight related arthopathies and hypovitaminosis D  that the patient is now seeking out bariatric surgery, specifically, the gastric bypass      Past Medical History:   Diagnosis Date    ADD (attention deficit disorder) 14 y/o     d/hanny meds 25 y/o    Cyst of left breast 3/15     6 month  for ff-up 9/15    Endometriosis     HTN (hypertension) 3/4/2010    Irregular menses     Kidney stone 2007    Migraine 3/4/2010    prn excedrine migraine OTC, trigger heat/dehydration    Morbid [address] [address]     BRIAN on CPAP     Pelvic cramping     Prediabetes        Past Surgical History:   Procedure Laterality Date    HX GYN  7/2002    endometriosis, lysis of adhesions d and c       Current Outpatient Prescriptions   Medication Sig Dispense Refill    hydroCHLOROthiazide (HYDRODIURIL) 25 mg tablet TAKE 1 TAB BY MOUTH DAILY. 90 Tab 2    labetalol (NORMODYNE) 200 mg tablet TAKE 1 TABLET BY MOUTH TWICE A  Tab 2    cholecalciferol, VITAMIN D3, (VITAMIN D3) 5,000 unit tab tablet Take  by mouth daily.  multivitamin with iron tablet Take 1 Tab by mouth daily.  loratadine (CLARITIN) 10 mg tablet Take 1 Tab by mouth daily.  30 Tab 11       No Known Allergies    Social History   Substance Use Topics    Smoking status: Former Smoker     Packs/day: 0.25     Quit date: 8/1/2002    Smokeless tobacco: Never Used      Comment: started 20y/o    Alcohol use Yes      Comment: states rare       Family History   Problem Relation Age of Onset    Alcohol abuse Mother     Ovarian Cancer Mother 54    Headache Mother     Osteoporosis Mother     Headache Father     Hypertension Father     Diabetes Father     Osteoporosis Maternal Grandmother     Cancer Maternal Grandmother     Hypertension Maternal Grandfather     Headache Paternal Grandmother     Arthritis-osteo Paternal Grandmother     Diabetes Paternal Grandfather     Stroke Paternal Grandfather      Family Status   Relation Status    Mother     Father     Maternal Grandmother     Maternal Grandfather     Paternal Grandmother     Paternal Grandfather        Review of Systems:  Positive in BOLD     CONST: Fever, weight loss, fatigue or chills  GI: Nausea, vomiting, abdominal pain, change in bowel habits, hematochezia, melena, and GERD   INTEG: Dermatitis, abnormal moles  HEENT: Recent changes in vision, vertigo, epistaxis, dysphagia and hoarseness  CV: Chest pain, palpitations, HTN, edema and varicosities  RESP: Cough, shortness of breath, wheezing, hemoptysis, snoring and reactive airway disease  : Hematuria, dysuria, frequency, urgency, nocturia and stress urinary incontinence   MS: Weakness, joint pain and arthritis  ENDO: Pre-Diabetes, thyroid disease, polyuria, polydipsia, polyphagia, poor wound healing, heat intolerance, cold intolerance  LYMPH/HEME: Anemia, bruising and history of blood transfusions  NEURO: Dizziness, headache, fainting, seizures and stroke  PSYCH: Anxiety and depression    Physical Exam    Visit Vitals    Resp 16    Ht 5' 9\" (1.753 m)    Wt 150.6 kg (332 lb)    LMP 06/18/2017    BMI 49.03 kg/m2         General: Well developed, well nourished 44 y.o.) female in no acute distress - gynecoid morbid obesity  Head: Normocephalic, atraumatic  Mouth: Clear, no overt lesions, oral mucosa pink and moist  Neck: Supple, no masses, no adenopathy or carotid bruits, trachea midline  Resp: Clear to auscultation bilaterally, now wheeze, rhonchi, or rales, excursions normal and symmetrical  Cardio: Regular rate and rhythm, no murmurs, clicks, gallops, or rubs. No edema or varicosities. Abdomen: Obese, soft, nontender, nondistended, normoactive bowel sounds, no hernias, no hepatosplenomegaly, easily palpable costal margins, gynecoid distribution  Extremities: Warm, well perfused, no tenderness or swelling, normal gait/station, healed scar to right lower leg  Neuro: Sensation and strength grossly intact and symmetrical  Psych: Alert and oriented to person, place, and time. Studies:    LABS: within normal limits except for hypovitaminosis D  EKG: without significant abnormalities  NUTRITIONAL EVALUATION has deemed her a good candidate for weight loss surgery  PSYCH EVAL: deems her a good candidate    Impression:    Ap Moura is a 44 y.o. female who is suffering from morbid obesity and comorbidities including hypertension, hyperlipidemia, obstructive sleep apnea - clinical and weight related arthopathieswho would benefit from bariatric surgery. We've discussed the restrictive and malabsorptive nature of the gastric bypass. The patient understands the likelihood of losing approximately 80% of their excess weight in 12 to 18 months. She also understands the risks including but not limited to bleeding, infection, need for reoperation, anastomotic ulcers, leaks and strictures, bowel obstruction secondary to adhesions and internal hernias, DVT, PE, heart attack, stroke, and death. Patient also understands risks of inadequate weight loss, excess weight loss, vitamin insufficiency, protein malnutrition, excess skin, and loss of hair.   We have reviewed the components of a successful postoperative course including requirement for a high protein, low carbohydrate diet, 60 oz a day of zero calorie liquids, daily vitamin supplementation, daily exercise, regular follow-up, and participation in support groups. We have reviewed the preoperative liver shrinking clear liquid diet, as well as reviewed any medication changes required while on the clear liquid diet. In addition, the patient understands that all medications to be taken during the first 8 weeks postoperatively are to be crushed and must therefore be in immediate release formulations. They further understand that it is their responsibility to review these and verify with their primary care doctor and pharmacist that all medications are crushable. We will schedule them for laparoscopic gastric bypass without liver biopsy in the near future.

## 2017-07-24 ENCOUNTER — ANESTHESIA EVENT (OUTPATIENT)
Dept: SURGERY | Age: 37
DRG: 621 | End: 2017-07-24
Payer: COMMERCIAL

## 2017-07-25 ENCOUNTER — ANESTHESIA (OUTPATIENT)
Dept: SURGERY | Age: 37
DRG: 621 | End: 2017-07-25
Payer: COMMERCIAL

## 2017-07-25 ENCOUNTER — HOSPITAL ENCOUNTER (INPATIENT)
Age: 37
LOS: 1 days | Discharge: HOME | DRG: 621 | End: 2017-07-26
Attending: SURGERY | Admitting: SURGERY
Payer: COMMERCIAL

## 2017-07-25 LAB
BUN BLD-MCNC: 11 MG/DL (ref 7–18)
CHLORIDE BLD-SCNC: 103 MMOL/L (ref 100–108)
GLUCOSE BLD STRIP.AUTO-MCNC: 107 MG/DL (ref 74–106)
HCG UR QL: NEGATIVE
HCT VFR BLD CALC: 38 % (ref 36–49)
HGB BLD-MCNC: 12.9 G/DL (ref 12–16)
POTASSIUM BLD-SCNC: 4.1 MMOL/L (ref 3.5–5.5)
SODIUM BLD-SCNC: 139 MMOL/L (ref 136–145)

## 2017-07-25 PROCEDURE — 76210000016 HC OR PH I REC 1 TO 1.5 HR: Performed by: SURGERY

## 2017-07-25 PROCEDURE — 0D164ZA BYPASS STOMACH TO JEJUNUM, PERCUTANEOUS ENDOSCOPIC APPROACH: ICD-10-PCS | Performed by: SURGERY

## 2017-07-25 PROCEDURE — 77030020782 HC GWN BAIR PAWS FLX 3M -B: Performed by: SURGERY

## 2017-07-25 PROCEDURE — 74011250637 HC RX REV CODE- 250/637: Performed by: NURSE ANESTHETIST, CERTIFIED REGISTERED

## 2017-07-25 PROCEDURE — 77030018846 HC SOL IRR STRL H20 ICUM -A: Performed by: SURGERY

## 2017-07-25 PROCEDURE — 77030020255 HC SOL INJ LR 1000ML BG: Performed by: SURGERY

## 2017-07-25 PROCEDURE — 77030035045 HC TRCR ENDOSC VRSPRT BLDLSS COVD -B: Performed by: SURGERY

## 2017-07-25 PROCEDURE — 77010033678 HC OXYGEN DAILY

## 2017-07-25 PROCEDURE — 74011250637 HC RX REV CODE- 250/637: Performed by: SURGERY

## 2017-07-25 PROCEDURE — 77030036732 HC RELD STPLR VASC J&J -F: Performed by: SURGERY

## 2017-07-25 PROCEDURE — 77030002996 HC SUT SLK J&J -A: Performed by: SURGERY

## 2017-07-25 PROCEDURE — 77030008437 HC REINF STRP REINF SEMGD WLGO -C: Performed by: SURGERY

## 2017-07-25 PROCEDURE — 77030034850: Performed by: SURGERY

## 2017-07-25 PROCEDURE — 77030031139 HC SUT VCRL2 J&J -A: Performed by: SURGERY

## 2017-07-25 PROCEDURE — 77030008756 HC TU IRR SUC STRY -B: Performed by: SURGERY

## 2017-07-25 PROCEDURE — C9290 INJ, BUPIVACAINE LIPOSOME: HCPCS | Performed by: SURGERY

## 2017-07-25 PROCEDURE — 74011250636 HC RX REV CODE- 250/636: Performed by: NURSE ANESTHETIST, CERTIFIED REGISTERED

## 2017-07-25 PROCEDURE — 77030034154 HC SHR COAG HARM ACE J&J -F: Performed by: SURGERY

## 2017-07-25 PROCEDURE — 74011000258 HC RX REV CODE- 258: Performed by: NURSE ANESTHETIST, CERTIFIED REGISTERED

## 2017-07-25 PROCEDURE — 74011000250 HC RX REV CODE- 250

## 2017-07-25 PROCEDURE — 81025 URINE PREGNANCY TEST: CPT

## 2017-07-25 PROCEDURE — 77030011640 HC PAD GRND REM COVD -A: Performed by: SURGERY

## 2017-07-25 PROCEDURE — 77030002933 HC SUT MCRYL J&J -A: Performed by: SURGERY

## 2017-07-25 PROCEDURE — 74011250636 HC RX REV CODE- 250/636

## 2017-07-25 PROCEDURE — 77030027876 HC STPLR ENDOSC FLX PWR J&J -G1: Performed by: SURGERY

## 2017-07-25 PROCEDURE — 77030036598 HC CARTDRG STPL RELD ECHELON FLX J&J -D: Performed by: SURGERY

## 2017-07-25 PROCEDURE — 82947 ASSAY GLUCOSE BLOOD QUANT: CPT

## 2017-07-25 PROCEDURE — 77030035048 HC TRCR ENDOSC OPTCL COVD -B: Performed by: SURGERY

## 2017-07-25 PROCEDURE — 77030013079 HC BLNKT BAIR HGGR 3M -A: Performed by: ANESTHESIOLOGY

## 2017-07-25 PROCEDURE — 65270000029 HC RM PRIVATE

## 2017-07-25 PROCEDURE — 77030032490 HC SLV COMPR SCD KNE COVD -B: Performed by: SURGERY

## 2017-07-25 PROCEDURE — 77030010507 HC ADH SKN DERMBND J&J -B: Performed by: SURGERY

## 2017-07-25 PROCEDURE — 76010000132 HC OR TIME 2.5 TO 3 HR: Performed by: SURGERY

## 2017-07-25 PROCEDURE — 76060000036 HC ANESTHESIA 2.5 TO 3 HR: Performed by: SURGERY

## 2017-07-25 PROCEDURE — 77030037878 HC DRSG MEPILEX >48IN BORD MOLN -B: Performed by: SURGERY

## 2017-07-25 PROCEDURE — 77030008683 HC TU ET CUF COVD -A: Performed by: ANESTHESIOLOGY

## 2017-07-25 PROCEDURE — 74011250636 HC RX REV CODE- 250/636: Performed by: SURGERY

## 2017-07-25 RX ORDER — FENTANYL CITRATE 50 UG/ML
INJECTION, SOLUTION INTRAMUSCULAR; INTRAVENOUS AS NEEDED
Status: DISCONTINUED | OUTPATIENT
Start: 2017-07-25 | End: 2017-07-25 | Stop reason: HOSPADM

## 2017-07-25 RX ORDER — SUCCINYLCHOLINE CHLORIDE 20 MG/ML
INJECTION INTRAMUSCULAR; INTRAVENOUS AS NEEDED
Status: DISCONTINUED | OUTPATIENT
Start: 2017-07-25 | End: 2017-07-25 | Stop reason: HOSPADM

## 2017-07-25 RX ORDER — PROPOFOL 10 MG/ML
INJECTION, EMULSION INTRAVENOUS AS NEEDED
Status: DISCONTINUED | OUTPATIENT
Start: 2017-07-25 | End: 2017-07-25 | Stop reason: HOSPADM

## 2017-07-25 RX ORDER — SODIUM CHLORIDE, SODIUM LACTATE, POTASSIUM CHLORIDE, CALCIUM CHLORIDE 600; 310; 30; 20 MG/100ML; MG/100ML; MG/100ML; MG/100ML
75 INJECTION, SOLUTION INTRAVENOUS CONTINUOUS
Status: DISCONTINUED | OUTPATIENT
Start: 2017-07-25 | End: 2017-07-25 | Stop reason: HOSPADM

## 2017-07-25 RX ORDER — FAMOTIDINE 20 MG/1
20 TABLET, FILM COATED ORAL ONCE
Status: COMPLETED | OUTPATIENT
Start: 2017-07-25 | End: 2017-07-25

## 2017-07-25 RX ORDER — CEFAZOLIN SODIUM IN 0.9 % NACL 2 G/100 ML
PLASTIC BAG, INJECTION (ML) INTRAVENOUS AS NEEDED
Status: DISCONTINUED | OUTPATIENT
Start: 2017-07-25 | End: 2017-07-25 | Stop reason: HOSPADM

## 2017-07-25 RX ORDER — PROMETHAZINE HYDROCHLORIDE 25 MG/ML
INJECTION, SOLUTION INTRAMUSCULAR; INTRAVENOUS
Status: DISPENSED
Start: 2017-07-25 | End: 2017-07-26

## 2017-07-25 RX ORDER — LIDOCAINE HYDROCHLORIDE 20 MG/ML
INJECTION, SOLUTION EPIDURAL; INFILTRATION; INTRACAUDAL; PERINEURAL AS NEEDED
Status: DISCONTINUED | OUTPATIENT
Start: 2017-07-25 | End: 2017-07-25 | Stop reason: HOSPADM

## 2017-07-25 RX ORDER — SODIUM CHLORIDE 0.9 % (FLUSH) 0.9 %
5-10 SYRINGE (ML) INJECTION AS NEEDED
Status: DISCONTINUED | OUTPATIENT
Start: 2017-07-25 | End: 2017-07-25 | Stop reason: HOSPADM

## 2017-07-25 RX ORDER — LABETALOL 200 MG/1
200 TABLET, FILM COATED ORAL EVERY 12 HOURS
Status: DISCONTINUED | OUTPATIENT
Start: 2017-07-25 | End: 2017-07-26 | Stop reason: HOSPADM

## 2017-07-25 RX ORDER — HYDROCODONE BITARTRATE AND ACETAMINOPHEN 7.5; 325 MG/15ML; MG/15ML
7.5 SOLUTION ORAL
Status: DISCONTINUED | OUTPATIENT
Start: 2017-07-25 | End: 2017-07-25

## 2017-07-25 RX ORDER — SODIUM CHLORIDE, SODIUM LACTATE, POTASSIUM CHLORIDE, CALCIUM CHLORIDE 600; 310; 30; 20 MG/100ML; MG/100ML; MG/100ML; MG/100ML
INJECTION, SOLUTION INTRAVENOUS
Status: DISCONTINUED | OUTPATIENT
Start: 2017-07-25 | End: 2017-07-25 | Stop reason: HOSPADM

## 2017-07-25 RX ORDER — ONDANSETRON 2 MG/ML
4 INJECTION INTRAMUSCULAR; INTRAVENOUS
Status: DISCONTINUED | OUTPATIENT
Start: 2017-07-25 | End: 2017-07-26

## 2017-07-25 RX ORDER — DEXAMETHASONE SODIUM PHOSPHATE 4 MG/ML
INJECTION, SOLUTION INTRA-ARTICULAR; INTRALESIONAL; INTRAMUSCULAR; INTRAVENOUS; SOFT TISSUE AS NEEDED
Status: DISCONTINUED | OUTPATIENT
Start: 2017-07-25 | End: 2017-07-25 | Stop reason: HOSPADM

## 2017-07-25 RX ORDER — MIDAZOLAM HYDROCHLORIDE 1 MG/ML
INJECTION, SOLUTION INTRAMUSCULAR; INTRAVENOUS AS NEEDED
Status: DISCONTINUED | OUTPATIENT
Start: 2017-07-25 | End: 2017-07-25 | Stop reason: HOSPADM

## 2017-07-25 RX ORDER — ONDANSETRON 2 MG/ML
INJECTION INTRAMUSCULAR; INTRAVENOUS AS NEEDED
Status: DISCONTINUED | OUTPATIENT
Start: 2017-07-25 | End: 2017-07-25 | Stop reason: HOSPADM

## 2017-07-25 RX ORDER — MAGNESIUM SULFATE 100 %
4 CRYSTALS MISCELLANEOUS AS NEEDED
Status: DISCONTINUED | OUTPATIENT
Start: 2017-07-25 | End: 2017-07-25 | Stop reason: HOSPADM

## 2017-07-25 RX ORDER — GLYCOPYRROLATE 0.2 MG/ML
INJECTION INTRAMUSCULAR; INTRAVENOUS AS NEEDED
Status: DISCONTINUED | OUTPATIENT
Start: 2017-07-25 | End: 2017-07-25 | Stop reason: HOSPADM

## 2017-07-25 RX ORDER — SODIUM CHLORIDE, SODIUM LACTATE, POTASSIUM CHLORIDE, CALCIUM CHLORIDE 600; 310; 30; 20 MG/100ML; MG/100ML; MG/100ML; MG/100ML
150 INJECTION, SOLUTION INTRAVENOUS CONTINUOUS
Status: DISCONTINUED | OUTPATIENT
Start: 2017-07-25 | End: 2017-07-26 | Stop reason: HOSPADM

## 2017-07-25 RX ORDER — ENOXAPARIN SODIUM 100 MG/ML
40 INJECTION SUBCUTANEOUS ONCE
Status: COMPLETED | OUTPATIENT
Start: 2017-07-25 | End: 2017-07-25

## 2017-07-25 RX ORDER — LIDOCAINE HYDROCHLORIDE 10 MG/ML
0.1 INJECTION, SOLUTION EPIDURAL; INFILTRATION; INTRACAUDAL; PERINEURAL AS NEEDED
Status: DISCONTINUED | OUTPATIENT
Start: 2017-07-25 | End: 2017-07-25 | Stop reason: HOSPADM

## 2017-07-25 RX ORDER — OXYCODONE HYDROCHLORIDE 5 MG/1
5 TABLET ORAL
Status: DISCONTINUED | OUTPATIENT
Start: 2017-07-25 | End: 2017-07-26 | Stop reason: HOSPADM

## 2017-07-25 RX ORDER — SODIUM CHLORIDE 0.9 % (FLUSH) 0.9 %
5-10 SYRINGE (ML) INJECTION EVERY 8 HOURS
Status: DISCONTINUED | OUTPATIENT
Start: 2017-07-25 | End: 2017-07-25 | Stop reason: HOSPADM

## 2017-07-25 RX ORDER — CEFAZOLIN SODIUM 2 G/50ML
2 SOLUTION INTRAVENOUS ONCE
Status: DISCONTINUED | OUTPATIENT
Start: 2017-07-25 | End: 2017-07-25 | Stop reason: HOSPADM

## 2017-07-25 RX ORDER — DIPHENHYDRAMINE HYDROCHLORIDE 50 MG/ML
25 INJECTION, SOLUTION INTRAMUSCULAR; INTRAVENOUS
Status: DISCONTINUED | OUTPATIENT
Start: 2017-07-25 | End: 2017-07-26 | Stop reason: HOSPADM

## 2017-07-25 RX ORDER — NEOSTIGMINE METHYLSULFATE 5 MG/5 ML
SYRINGE (ML) INTRAVENOUS AS NEEDED
Status: DISCONTINUED | OUTPATIENT
Start: 2017-07-25 | End: 2017-07-25 | Stop reason: HOSPADM

## 2017-07-25 RX ORDER — HYDROMORPHONE HYDROCHLORIDE 1 MG/ML
1 INJECTION, SOLUTION INTRAMUSCULAR; INTRAVENOUS; SUBCUTANEOUS
Status: DISCONTINUED | OUTPATIENT
Start: 2017-07-25 | End: 2017-07-26 | Stop reason: HOSPADM

## 2017-07-25 RX ORDER — ENOXAPARIN SODIUM 100 MG/ML
40 INJECTION SUBCUTANEOUS DAILY
Status: DISCONTINUED | OUTPATIENT
Start: 2017-07-26 | End: 2017-07-26 | Stop reason: HOSPADM

## 2017-07-25 RX ORDER — ONDANSETRON 2 MG/ML
INJECTION INTRAMUSCULAR; INTRAVENOUS
Status: DISPENSED
Start: 2017-07-25 | End: 2017-07-26

## 2017-07-25 RX ORDER — DEXTROSE 50 % IN WATER (D50W) INTRAVENOUS SYRINGE
25-50 AS NEEDED
Status: DISCONTINUED | OUTPATIENT
Start: 2017-07-25 | End: 2017-07-25 | Stop reason: HOSPADM

## 2017-07-25 RX ORDER — KETOROLAC TROMETHAMINE 30 MG/ML
30 INJECTION, SOLUTION INTRAMUSCULAR; INTRAVENOUS EVERY 6 HOURS
Status: COMPLETED | OUTPATIENT
Start: 2017-07-25 | End: 2017-07-26

## 2017-07-25 RX ORDER — ROCURONIUM BROMIDE 10 MG/ML
INJECTION, SOLUTION INTRAVENOUS AS NEEDED
Status: DISCONTINUED | OUTPATIENT
Start: 2017-07-25 | End: 2017-07-25 | Stop reason: HOSPADM

## 2017-07-25 RX ORDER — HYDROMORPHONE HYDROCHLORIDE 2 MG/ML
0.5 INJECTION, SOLUTION INTRAMUSCULAR; INTRAVENOUS; SUBCUTANEOUS AS NEEDED
Status: DISCONTINUED | OUTPATIENT
Start: 2017-07-25 | End: 2017-07-25 | Stop reason: HOSPADM

## 2017-07-25 RX ADMIN — KETOROLAC TROMETHAMINE 30 MG: 30 INJECTION, SOLUTION INTRAMUSCULAR at 17:02

## 2017-07-25 RX ADMIN — SODIUM CHLORIDE, SODIUM LACTATE, POTASSIUM CHLORIDE, AND CALCIUM CHLORIDE 150 ML/HR: 600; 310; 30; 20 INJECTION, SOLUTION INTRAVENOUS at 15:00

## 2017-07-25 RX ADMIN — FAMOTIDINE 20 MG: 20 TABLET ORAL at 08:22

## 2017-07-25 RX ADMIN — DEXAMETHASONE SODIUM PHOSPHATE 4 MG: 4 INJECTION, SOLUTION INTRA-ARTICULAR; INTRALESIONAL; INTRAMUSCULAR; INTRAVENOUS; SOFT TISSUE at 10:46

## 2017-07-25 RX ADMIN — HYDROMORPHONE HYDROCHLORIDE 0.5 MG: 2 INJECTION, SOLUTION INTRAMUSCULAR; INTRAVENOUS; SUBCUTANEOUS at 13:30

## 2017-07-25 RX ADMIN — ENOXAPARIN SODIUM 40 MG: 40 INJECTION SUBCUTANEOUS at 08:28

## 2017-07-25 RX ADMIN — SUCCINYLCHOLINE CHLORIDE 140 MG: 20 INJECTION INTRAMUSCULAR; INTRAVENOUS at 10:35

## 2017-07-25 RX ADMIN — SODIUM CHLORIDE, SODIUM LACTATE, POTASSIUM CHLORIDE, AND CALCIUM CHLORIDE 75 ML/HR: 600; 310; 30; 20 INJECTION, SOLUTION INTRAVENOUS at 08:22

## 2017-07-25 RX ADMIN — SODIUM CHLORIDE, SODIUM LACTATE, POTASSIUM CHLORIDE, AND CALCIUM CHLORIDE 150 ML/HR: 600; 310; 30; 20 INJECTION, SOLUTION INTRAVENOUS at 21:06

## 2017-07-25 RX ADMIN — HYDROMORPHONE HYDROCHLORIDE 0.5 MG: 2 INJECTION, SOLUTION INTRAMUSCULAR; INTRAVENOUS; SUBCUTANEOUS at 13:40

## 2017-07-25 RX ADMIN — ROCURONIUM BROMIDE 5 MG: 10 INJECTION, SOLUTION INTRAVENOUS at 12:15

## 2017-07-25 RX ADMIN — FENTANYL CITRATE 150 MCG: 50 INJECTION, SOLUTION INTRAMUSCULAR; INTRAVENOUS at 10:35

## 2017-07-25 RX ADMIN — MIDAZOLAM HYDROCHLORIDE 2 MG: 1 INJECTION, SOLUTION INTRAMUSCULAR; INTRAVENOUS at 10:29

## 2017-07-25 RX ADMIN — LABETALOL HYDROCHLORIDE 200 MG: 200 TABLET, FILM COATED ORAL at 15:21

## 2017-07-25 RX ADMIN — FENTANYL CITRATE 50 MCG: 50 INJECTION, SOLUTION INTRAMUSCULAR; INTRAVENOUS at 11:01

## 2017-07-25 RX ADMIN — ROCURONIUM BROMIDE 45 MG: 10 INJECTION, SOLUTION INTRAVENOUS at 10:46

## 2017-07-25 RX ADMIN — SODIUM CHLORIDE, SODIUM LACTATE, POTASSIUM CHLORIDE, CALCIUM CHLORIDE: 600; 310; 30; 20 INJECTION, SOLUTION INTRAVENOUS at 11:30

## 2017-07-25 RX ADMIN — GLYCOPYRROLATE 0.6 MG: 0.2 INJECTION INTRAMUSCULAR; INTRAVENOUS at 12:57

## 2017-07-25 RX ADMIN — PROMETHAZINE HYDROCHLORIDE 12.5 MG: 25 INJECTION INTRAMUSCULAR; INTRAVENOUS at 13:50

## 2017-07-25 RX ADMIN — SODIUM CHLORIDE, SODIUM LACTATE, POTASSIUM CHLORIDE, CALCIUM CHLORIDE: 600; 310; 30; 20 INJECTION, SOLUTION INTRAVENOUS at 10:25

## 2017-07-25 RX ADMIN — ROCURONIUM BROMIDE 5 MG: 10 INJECTION, SOLUTION INTRAVENOUS at 10:35

## 2017-07-25 RX ADMIN — ACETAMINOPHEN 650 MG: 650 SOLUTION ORAL at 15:21

## 2017-07-25 RX ADMIN — Medication 3 MG: at 12:57

## 2017-07-25 RX ADMIN — PROPOFOL 180 MG: 10 INJECTION, EMULSION INTRAVENOUS at 10:35

## 2017-07-25 RX ADMIN — LIDOCAINE HYDROCHLORIDE 40 MG: 20 INJECTION, SOLUTION EPIDURAL; INFILTRATION; INTRACAUDAL; PERINEURAL at 10:35

## 2017-07-25 RX ADMIN — FENTANYL CITRATE 50 MCG: 50 INJECTION, SOLUTION INTRAMUSCULAR; INTRAVENOUS at 12:17

## 2017-07-25 RX ADMIN — SODIUM CHLORIDE 1000 ML: 900 INJECTION, SOLUTION INTRAVENOUS at 13:30

## 2017-07-25 RX ADMIN — ONDANSETRON 8 MG: 2 INJECTION INTRAMUSCULAR; INTRAVENOUS at 12:44

## 2017-07-25 RX ADMIN — ACETAMINOPHEN 650 MG: 650 SOLUTION ORAL at 21:06

## 2017-07-25 RX ADMIN — Medication 2 G: at 10:32

## 2017-07-25 NOTE — IP AVS SNAPSHOT
Sonya Perkins 
 
 
 [address] Patient: Ivan Tirado MRN: [id number] Current Discharge Medication List  
  
START taking these medications Dose & Instructions Dispensing Information Comments Morning Noon Evening Bedtime  
 enoxaparin 40 mg/0.4 mL Commonly known as:  LOVENOX Your last dose was: Your next dose is:    
   
   
 Dose:  40 mg  
0.4 mL by SubCUTAneous route daily. Indications: DEEP VEIN THROMBOSIS PREVENTION Quantity:  7 Syringe Refills:  0  
     
   
   
   
  
 ondansetron 4 mg disintegrating tablet Commonly known as:  ZOFRAN ODT Your last dose was: Your next dose is:    
   
   
 Dose:  4 mg Take 1 Tab by mouth every six (6) hours as needed. Indications: PREVENTION OF POST-OPERATIVE NAUSEA AND VOMITING Quantity:  30 Tab Refills:  0  
     
   
   
   
  
 oxyCODONE IR 5 mg immediate release tablet Commonly known as:  Holland Navas Your last dose was: Your next dose is:    
   
   
 Dose:  5 mg Take 1 Tab by mouth every six (6) hours as needed. Max Daily Amount: 20 mg.  
 Quantity:  30 Tab Refills:  0 CONTINUE these medications which have NOT CHANGED Dose & Instructions Dispensing Information Comments Morning Noon Evening Bedtime  
 cholecalciferol (VITAMIN D3) 5,000 unit Tab tablet Commonly known as:  VITAMIN D3 Your last dose was: Your next dose is: Take  by mouth daily. Refills:  0  
     
   
   
   
  
 labetalol 200 mg tablet Commonly known as:  Ednarex Garvey Your last dose was: Your next dose is: TAKE 1 TABLET BY MOUTH TWICE A DAY Quantity:  180 Tab Refills:  2  
     
   
   
   
  
 loratadine 10 mg tablet Commonly known as:  Alejandro Guajardo Your last dose was:     
   
Your next dose is:    
   
   
 Dose:  10 mg  
 Take 1 Tab by mouth daily. Quantity:  30 Tab Refills:  11  
     
   
   
   
  
 multivitamin with iron tablet Your last dose was: Your next dose is:    
   
   
 Dose:  1 Tab Take 1 Tab by mouth daily. Refills:  0 STOP taking these medications   
 hydroCHLOROthiazide 25 mg tablet Commonly known as:  HYDRODIURIL Where to Get Your Medications Information on where to get these meds will be given to you by the nurse or doctor. ! Ask your nurse or doctor about these medications  
  enoxaparin 40 mg/0.4 mL  
 ondansetron 4 mg disintegrating tablet  
 oxyCODONE IR 5 mg immediate release tablet

## 2017-07-25 NOTE — ROUTINE PROCESS
Bedside and Verbal shift change report given to [facility] (oncoming nurse) by Randi Jose (offgoing nurse). Report included the following information SBAR, Kardex, MAR and Recent Results.     SITUATION:    Code Status: Full Code   Reason for Admission: Morbid obesity, unspecified obesity type ([facility] [address] [E66.01]    [facility] day: 0   Problem List:       Hospital Problems  Date Reviewed: 7/25/2017          Codes Class Noted POA    Morbid obesity ([facility] [address] ICD-10-CM: E66.01  ICD-9-CM: 278.01  12/10/2013 Unknown              BACKGROUND:    Past Medical History:   Past Medical History:   Diagnosis Date    ADD (attention deficit disorder) 14 y/o     d/hanny meds 23 y/o    Cyst of left breast 3/15     6 month US for ff-up 9/15    Endometriosis     HTN (hypertension) 3/4/2010    Irregular menses     Kidney stone 2007    Migraine 3/4/2010    prn excedrine migraine OTC, trigger heat/dehydration    Morbid obesity ([facility] [address]     BRIAN on CPAP     NOT USING CPAP    Pelvic cramping     Prediabetes          Patient taking anticoagulants no     ASSESSMENT:    Changes in Assessment Throughout Shift: no     Patient has Central Line: no    Patient has Cerda Cath: yes Reasons if yes: surgical      Last Vitals:     Vitals:    07/25/17 1416 07/25/17 1424 07/25/17 1458 07/25/17 1601   BP: (!) 146/93 146/88 (!) 145/94 150/85   Pulse: 88 97 84 93   Resp: 16 16 16 17   Temp:   97 °F (36.1 °C) 98.3 °F (36.8 °C)   SpO2: 98% 97% 98% 99%   Weight:       Height:       LMP: 06/18/2017        IV and DRAINS (will only show if present)   Peripheral IV 07/25/17 Right Hand-Site Assessment: Clean, dry, & intact     WOUND (if present)   Wound Type:  5 lap site   Dressing present Dressing Present : No   Wound Concerns/Notes:  none     PAIN    Pain Assessment    Pain Intensity 1: 5 (07/25/17 1346)    Pain Location 1: Abdomen    Pain Intervention(s) 1: Medication (see MAR)    Patient Stated Pain Goal: 4  o Interventions for Pain:  See mar  o Intervention effective: yes  o Time of last intervention: see mar   o Reassessment Completed: no      Last 3 Weights:  Last 3 Recorded Weights in this Encounter    07/12/17 1419 07/25/17 0813   Weight: 149.7 kg (330 lb) 149.7 kg (330 lb)     Weight change:      INTAKE/OUPUT    Current Shift: 07/25 0701 - 07/25 1900  In: 2082.5 [I.V.:2082.5]  Out: 7495 [Urine:1450]    Last three shifts:       LAB RESULTS   No results for input(s): WBC, HGB, HCT, PLT, HGBEXT, HCTEXT, PLTEXT in the last 72 hours. No results for input(s): NA, K, GLU, BUN, CREA, CA, MG, INR in the last 72 hours. No lab exists for component: PT, PTT, INREXT    RECOMMENDATIONS AND DISCHARGE PLANNING     1. Pending tests/procedures/ Plan of Care or Other Needs: no     2. Discharge plan for patient and Needs/Barriers: no    3. Estimated Discharge Date: n/a Posted on Whiteboard in Patients Room: no      4. The patient's care plan was reviewed with the oncoming nurse. \"HEALS\" SAFETY CHECK      Fall Risk    Total Score: 1    Safety Measures: Safety Measures: Bed/Chair-Wheels locked, Bed in low position, Call light within reach, Fall prevention (comment), Gripper socks, Side rails X 3    A safety check occurred in the patient's room between off going nurse and oncoming nurse listed above.     The safety check included the below items  Area Items   H  High Alert Medications - Verify all high alert medication drips (heparin, PCA, etc.)   E  Equipment - Suction is set up for ALL patients (with yanker)  - Red plugs utilized for all equipment (IV pumps, etc.)  - WOWs wiped down at end of shift.  - Room stocked with oxygen, suction, and other unit-specific supplies   A  Alarms - Bed alarm is set for fall risk patients  - Ensure chair alarm is in place and activated if patient is up in a chair   L  Lines - Check IV for any infiltration  - Cerda bag is empty if patient has a Cerda   - Tubing and IV bags are labeled   S  Safety   - Room is clean, patient is clean, and equipment is clean. - Hallways are clear from equipment besides carts. - Fall bracelet on for fall risk patients  - Ensure room is clear and free of clutter  - Suction is set up for ALL patients (with tiff)  - Hallways are clear from equipment besides carts.    - Isolation precautions followed, supplies available outside room, sign posted     Danya Noble

## 2017-07-25 NOTE — INTERVAL H&P NOTE
H&P Update:  Lane Arellano was seen and examined. History and physical has been reviewed. The patient has been examined.  There have been no significant clinical changes since the completion of the originally dated History and Physical.    Signed By: Braxton Lee MD     July 25, 2017 9:50 AM

## 2017-07-25 NOTE — OP NOTES
DATE: 7/25/2017    PREOPERATIVE DIAGNOSIS: Clinically severe obesity, body mass index of 49,   comorbidities of hypertension, hyperlipidemia, obstructive sleep apnea - clinical, weight related arthopathies and hypovitaminosis D.   POSTOPERATIVE DIAGNOSIS: Clinically severe obesity, body mass index of 49,   comorbidities of hypertension, hyperlipidemia, obstructive sleep apnea - clinical, weight related arthopathies and hypovitaminosis D  PROCEDURES: Laparoscopic Adan-en-Y gastric bypass with 681 cm retrocolic   retrogastric Adan limb, 40 cm biliopancreatic limb, 15 ml    tubularized gastric pouch applied to the lesser curvature of stomach  SURGEON: Dr. Jefferson Matias. Wilbert Dave MD, FACS   ASSISTANT: Jessica Swan SA  ANESTHESIA: General endotracheal anesthesia. Local anesthetic Exparel 20 mL. FINDINGS: None  SPECIMEN: None  IMPLANTS: * No implants in log *  ESTIMATED BLOOD LOSS: 25 ml  FLUIDS: 1650 ml   URINE OUTPUT: 100 ml   DRAIN: None  COMPLICATIONS: None  OPERATIVE START TIME: 1056  OPERATIVE COMPLETION TIME: 1300    DESCRIPTION OF PROCEDURE: The patient was prepped and draped in standard sterile fashion after being placed under general anesthetic. A site was selected superior to the umbilicus in the midline. This area was incised. A Hydrocision 12 mm Optical trocar was placed over the laparoscope and directed into the abdominal cavity using Optiview technique. Abdomen was insufflated to 15 mmHg and surveyed. There was no evidence of injury from the entry. Additional trocars were then placed. One 5 mmm trocar was placed in the anterior axillary line left upper quadrant approximately 3 fingerbreadths below the costal margin. Another 12 mm trocar was placed in the lateral portion of the left rectus sheath approximately 3 superior to the umbilical trocar. Another 12 mm trocar was placed approximately 4 fingerbreadths superior to the umbilical trocar in the  lateral portion of right rectus sheath.  All were placed after incising with scalpel, all were placed using blunt dissecting technique. There was no evidence of injury from the entries. Instrument were placed in the abdominal cavity. The omentum and transverse colon were retracted superiorly, exposing ligament of Treitz. Small bowel was measured 40 cm distal to the ligament of Treitz, divided at this level with A 60 mm [place] stapler. One additional load was used to divide down to the base of the mesentery, confirming preservation of blood flow to the small bowel above and below the staple line prior to firing. Then, from the distal staple line, the Adan limb was measured 100 cm distal and an antimesenteric enterotomy was made. Another antimesenteric enterotomy was made just proximal to the proximal staple line of the biliopancreatic limb. Through these enterotomies, a 60 mm [place] stapler was placed into the bowel, clamped on the antimesenteric borders and fired to form a stapled side-to-side anastomosis. Remaining enterotomy was closed in a running inverting fashion with 3-0 Vicryl suture. The mesenteric defect was then closed with running 2-0 silk suture, extending on the bowel wall in a running Lembert fashion for second layer closure of the enterotomy. At this point, attention was directed to the transverse mesocolon. A site was selected just anterior to the ligament of Treitz. This area was incised, entering the lesser sac. The Adan limb was then passed through the fenestration of the transverse mesocolon and into the lesser sac taking care not to twist on its mesentery. At this point, the omentum and transverse colon were retracted inferiorly. The greater curvature of the stomach was then grasped and the gastrocolic ligament was dissected off of it directly on the wall of the stomach using the Harmonic scalpel to widely open the lesser sac.  Adhesions between the posterior wall of the stomach and the retroperitoneum were taken down under direct vision to fully free the lesser sac. At this point, an incision was made near the xiphoid. Through this incision, a Natividad retractor was placed through the abdominal wall beneath the left lateral segment of the liver and connected to a bedside retraction system allowing exposure of the esophageal hiatus. The angle of His was then dissected anterior to posterior down the left tomer of the diaphragm using Harmonic scalpel to assist in eventual division of the gastric pouch and distal stomach remnant. The lesser curvature of the stomach was then examined. A site was selected just proximal to the crow's foot of the vagus nerve in this area. The gastrohepatic ligament was dissected away from the lesser curvature of the stomach directly on the wall of the stomach to enter the lesser sac. This fenestration was then widened using Bovie cautery over a 10 mm articulating esophageal retractor which had been placed through the lesser sac and brought through the fenestration. Then, a 60 mm [place] stapler was placed transversely across the stomach through this fenestration, clamped and then fired to begin the formation of the gastric pouch. Another stapler was placed near the crotch of the previous staple line and directed superiorly toward the angle of His, and clamped. Prior to firing, a 34-Welsh orogastric Gil tube was brought through the esophagus into the stomach so its tip was against the transverse staple line, its course lying along the lesser curvature of stomach. The stapler was snugged against it and then fired. Then, a 60 mm [place] stapler with Seam Guard reinforcement was placed in the crotch of the previous staple line and clamped and then fired. Then, one additional staple loads was placed from the crotch of previous staple line directed superiorly toward the angle of His until the gastric pouch was fully divided from the distal stomach remnant.      Once fully divided, the staple lines were examined, noted to be hemostatic and viable. The lesser omentum was placed between the staple lines to prevent gastro-gastric fistulization. The tip of the gastric pouch placed in the lesser sac and the distal stomach remnant was retracted anteriorly to allow exposure of the lesser sac. The Adan limb was examined. The proximal 6 cm were noted to be tethered by its mesentery. This proximal segment was then excised from the mesentery using a 60 mm [place] staple load and then one additional load was used to divide the devascularized segment from the remainder of the Adan limb. This segment was brought out of the abdominal cavity via one of the trocar sites, passed off the field and discarded. At this point, the gastrojejunostomy was begun by sewing the right antimesenteric border of the Adan limb to the distal portion of the gastric pouch using running suture of 3-0 Vicryl. An anterior gastrotomy and antimesenteric enterotomy were made. From the left apex of these 2 enterotomies, a 3-0 Vicryl suture was placed and run on the posterior surface in a running inverting fashion to the right apex, transitioned on the anterior surface and sewn approximately half way across the opening in an inverting fashion. Then, another 3-0 Vicryl suture was placed at the left apex and sewn to the previous suture in a running inverting fashion on the anterior surface. Prior to tying these sutures, the Gil tube was brought across the anastomosis, then the sutures were tied, completing the inner layer. Then, from the left apex another 3-0 Vicryl suture was placed and run to the right apex in a running Lembert fashion completing the anterior outer layer os the anastomosis, thereby completing the anastomosis. Once this was complete, the Gil tube was removed from the patient.  Then, working from within the lesser sac, the Adan limb was sewn to the right anterior surface pf the transverse mesocolic defect with  3 interrupted sutures of 2-0 silk. Then, the left side of the adan limb mesentery was closed to the left side of the tranverse mesocolic defect with a running suture of 2-0 silk. The omentum and transverse colon were retracted superiorly. The base of the left side of the transverse mesocolic defect was exposed and this was sewn to the base of the left side of the Adan limb mesentery with a pursestring suture of 2-0 silk. The Adan limb was then retracted to the left. The right-side of the  transverse mesocolic defect was closed to the right side of the Adan limb mesentery with another pursestring suture of 2-0 silk, then one additional interrupted suture was placed between the right lateral surface of the Adan limb and the right lateral surface of the transverse mesocolic defect, completing the closure of the defect around the Adan limb. Once this was complete, both mesenteric defects were examined and noted to be well closed. Both anastomoses were examined and noted to be hemostatic and viable without evidence of leak. The omentum and transverse colon were retracted inferiorly back to normal position. The gastric remnant was placed over the anastomosis, returning the anastomosis into the lesser sac. The Natividad retractor was removed. The midline trocar site was closed at its fascia using #2 Vicryl placed with a laparoscopic suture passer under direct vision without difficulty or injury. At this point, the abdomen was desufflated via the remaining trocars. All trocars were then removed. The trocar sites were rendered hemostatic with Bovie cautery, anesthetized with the local anesthetic mixture and then closed with interrupted 4-0 Monocryl deep dermal sutures. Dermabond was applied as wound dressings. The patient tolerated the procedure well.

## 2017-07-25 NOTE — ROUTINE PROCESS
Patient arrived from PACU in stable condition, Alert and oriented x 4. Easily arousable, Speech is clear. Clear lungs,Denies any acute distress, SOB or Chest pain. Patient is on 3 L  Of 02 NC. Patient is oriented to the room, call bell use. Family at the bedside,  5 lap sites on abdomen with dermabond glue CDI. Bed in low position. Will continue to monitor.

## 2017-07-25 NOTE — ANESTHESIA POSTPROCEDURE EVALUATION
Post-Anesthesia Evaluation and Assessment    Patient: Deonna Stein MRN: [id number]  SSN: [id number]    YOB: 1980  Age: 44 y.o. Sex: female       Cardiovascular Function/Vital Signs  Visit Vitals    /88    Pulse 97    Temp 36.7 °C (98 °F)    Resp 16    Ht 5' 9\" (1.753 m)    Wt 149.7 kg (330 lb)    SpO2 97%    BMI 48.73 kg/m2       Patient is status post general anesthesia for Procedure(s):  LAPAROSCOPIC EVELIA-EN-Y GASTRIC BYPASS. Nausea/Vomiting: None    Postoperative hydration reviewed and adequate. Pain:  Pain Scale 1: Numeric (0 - 10) (07/25/17 1346)  Pain Intensity 1: 5 (07/25/17 1346)   Managed    Neurological Status:   Neuro (WDL): Within Defined Limits (07/25/17 1322)   At baseline    Mental Status and Level of Consciousness: Arousable    Pulmonary Status:   O2 Device: Nasal cannula (07/25/17 1337)   Adequate oxygenation and airway patent    Complications related to anesthesia: None    Post-anesthesia assessment completed.  No concerns    Signed By: Jaya Vaughn MD     July 25, 2017

## 2017-07-25 NOTE — IP AVS SNAPSHOT
[address] 1501 [facility] Patient: Marcie Conley MRN: [id number] SZD:[id number] You are allergic to the following No active allergies Recent Documentation Height Weight Breastfeeding? BMI OB Status Smoking Status 1.753 m 149.7 kg No 48.73 kg/m2 Having regular periods Former Smoker Emergency Contacts Name Discharge Info Relation Home Work Mobile Chin Avila  Spouse [3] [phone] About your hospitalization You were admitted on:  July 25, 2017 You last received care in the:  [facility] [address] You were discharged on:  July 26, 2017 Unit phone number:  [phone] Why you were hospitalized Your primary diagnosis was:  Not on File Your diagnoses also included: Morbid Obesity (Hcc) Providers Seen During Your Hospitalizations Provider Role Specialty Primary office phone Romario Guardado MD Attending Provider General Surgery [phone] Your Primary Care Physician (PCP) Primary Care Physician Office Phone Office Fax [facility] [phone] Follow-up Information Follow up With Details Comments Contact Info Becki Denson MD Go on 8/2/2017 @ 9:30 a.m.  [facility] [address] 200 [facility] 
[phone] Romario Guardado MD Go on 8/9/2017 @ 9:15 a.m. [facility] view location) [address] [place] [address] [address]  [facility] [address] [address] 
[phone] Your Appointments Wednesday August 02, 2017  9:30 AM EDT  
POST HOSPITAL with Becki Denson MD  
[address] [facility] [place] [address] 200 [facility]  
[phone] Wednesday August 09, 2017  9:15 AM EDT  
POST OP with Romario Guardado MD  
[facility] [address] [address] 200 [facility]  
[phone] Thursday August 31, 2017 10:00 AM EDT  
BARIATRIC PRE-OP with [facility] BARIATRIC DIETITIAN  
[facility]  (Tavo Jones) 23[address] [facility] [place] [address] [phone] Please report to: [facility] ([place] [address] Specialists, located on the 2nd floor) [place], [place] Margy StrJohn Current Discharge Medication List  
  
START taking these medications Dose & Instructions Dispensing Information Comments Morning Noon Evening Bedtime  
 enoxaparin 40 mg/0.4 mL Commonly known as:  LOVENOX Your last dose was: Your next dose is:    
   
   
 Dose:  40 mg  
0.4 mL by SubCUTAneous route daily. Indications: DEEP VEIN THROMBOSIS PREVENTION Quantity:  7 Syringe Refills:  0  
     
   
   
   
  
 ondansetron 4 mg disintegrating tablet Commonly known as:  ZOFRAN ODT Your last dose was: Your next dose is:    
   
   
 Dose:  4 mg Take 1 Tab by mouth every six (6) hours as needed. Indications: PREVENTION OF POST-OPERATIVE NAUSEA AND VOMITING Quantity:  30 Tab Refills:  0  
     
   
   
   
  
 oxyCODONE IR 5 mg immediate release tablet Commonly known as:  Rogers Bills Your last dose was: Your next dose is:    
   
   
 Dose:  5 mg Take 1 Tab by mouth every six (6) hours as needed. Max Daily Amount: 20 mg.  
 Quantity:  30 Tab Refills:  0 CONTINUE these medications which have NOT CHANGED Dose & Instructions Dispensing Information Comments Morning Noon Evening Bedtime  
 cholecalciferol (VITAMIN D3) 5,000 unit Tab tablet Commonly known as:  VITAMIN D3 Your last dose was: Your next dose is: Take  by mouth daily. Refills:  0  
     
   
   
   
  
 labetalol 200 mg tablet Commonly known as:  Carron Tomlinson Your last dose was: Your next dose is: TAKE 1 TABLET BY MOUTH TWICE A DAY Quantity:  180 Tab Refills:  2  
     
   
   
   
  
 loratadine 10 mg tablet Commonly known as:  Verita Sonia Your last dose was: Your next dose is:    
   
   
 Dose:  10 mg Take 1 Tab by mouth daily. Quantity:  30 Tab Refills:  11  
     
   
   
   
  
 multivitamin with iron tablet Your last dose was: Your next dose is:    
   
   
 Dose:  1 Tab Take 1 Tab by mouth daily. Refills:  0 STOP taking these medications   
 hydroCHLOROthiazide 25 mg tablet Commonly known as:  HYDRODIURIL Where to Get Your Medications Information on where to get these meds will be given to you by the nurse or doctor. ! Ask your nurse or doctor about these medications  
  enoxaparin 40 mg/0.4 mL  
 ondansetron 4 mg disintegrating tablet  
 oxyCODONE IR 5 mg immediate release tablet Discharge Instructions DISCHARGE SUMMARY from Nurse The following personal items are in your possession at time of discharge: 
 
Dental Appliances: None Visual Aid: At bedside, Glasses Home Medications: None Jewelry: None Clothing: Dress, Undergarments Other Valuables: Cell Phone PATIENT INSTRUCTIONS: 
 
 
F-face looks uneven A-arms unable to move or move unevenly S-speech slurred or non-existent T-time-call 911 as soon as signs and symptoms begin-DO NOT go Back to bed or wait to see if you get better-TIME IS BRAIN. Warning Signs of HEART ATTACK Call 911 if you have these symptoms: 
? Chest discomfort. Most heart attacks involve discomfort in the center of the chest that lasts more than a few minutes, or that goes away and comes back. It can feel like uncomfortable pressure, squeezing, fullness, or pain. ? Discomfort in other areas of the upper body. Symptoms can include pain or discomfort in one or both arms, the back, neck, jaw, or stomach. ? Shortness of breath with or without chest discomfort. ? Other signs may include breaking out in a cold sweat, nausea, or lightheadedness. Don't wait more than five minutes to call [address]! Fast action can save your life. Calling 911 is almost always the fastest way to get lifesaving treatment. Emergency Medical Services staff can begin treatment when they arrive  up to an hour sooner than if someone gets to the hospital by car. The discharge information has been reviewed with the patient. The patient verbalized understanding. Discharge medications reviewed with the patient and appropriate educational materials and side effects teaching were provided. HouseCall Activation Thank you for requesting access to HouseCall. Please follow the instructions below to securely access and download your online medical record. HouseCall allows you to send messages to your doctor, view your test results, renew your prescriptions, schedule appointments, and more. How Do I Sign Up? 1. In your internet browser, go to [email or web address] it Wave 
2. Click on the First Time User? Click Here link in the Sign In box. You will be redirect to the New Member Sign Up page. 3. Enter your HouseCall Access Code exactly as it appears below. You will not need to use this code after youve completed the sign-up process. If you do not sign up before the expiration date, you must request a new code. HouseCall Access Code: Activation code not generated Current HouseCall Status: Active (This is the date your HouseCall access code will ) 4. Enter the last four digits of your Social Security Number (xxxx) and Date of Birth (mm/dd/yyyy) as indicated and click Submit. You will be taken to the next sign-up page. 5. Create a HouseCall ID.  This will be your HouseCall login ID and cannot be changed, so think of one that is secure and easy to remember. 6. Create a [employer] password. You can change your password at any time. 7. Enter your Password Reset Question and Answer. This can be used at a later time if you forget your password. 8. Enter your e-mail address. You will receive e-mail notification when new information is available in [address] 9. Click Sign Up. You can now view and download portions of your medical record. 10. Click the Download Summary menu link to download a portable copy of your medical information. Additional Information If you have questions, please visit the Frequently Asked Questions section of the [employer] website at [email or web address] Remember, N[employer] is NOT to be used for urgent needs. For medical emergencies, dial 911. Patient armband removed and shredded Discharge Orders None [facility]! Dear Regi Trejo: Thank you for requesting a [employer] account. Our records indicate that you already have an active [employer] account. You can access your account anytime at [email or web address] Did you know that you can access your hospital and ER discharge instructions at any time in [employer]? You can also review all of your test results from your hospital stay or ER visit. Additional Information If you have questions, please visit the Frequently Asked Questions section of the [employer] website at [email or web address] Remember, [employer] is NOT to be used for urgent needs. For medical emergencies, dial 911. Now available from your iPhone and Android! General Information Please provide this summary of care documentation to your next provider. Patient Signature:  ____________________________________________________________ Date:  ____________________________________________________________  
  
Jose Guadalupe Abraham  Provider Signature: ____________________________________________________________ Date:  ____________________________________________________________

## 2017-07-25 NOTE — PERIOP NOTES
TRANSFER - OUT REPORT:    Verbal report given to Abdoul Miller RN(name) on Lucas Mijares  being transferred to [address] [place] [address](unit) for routine post - op       Report consisted of patients Situation, Background, Assessment and   Recommendations(SBAR). Information from the following report(s) SBAR, OR Summary, Procedure Summary, Intake/Output and MAR was reviewed with the receiving nurse. Lines:   Peripheral IV 07/25/17 Right Hand (Active)   Site Assessment Clean, dry, & intact 7/25/2017  1:22 PM   Phlebitis Assessment 0 7/25/2017  1:22 PM   Infiltration Assessment 0 7/25/2017  1:22 PM   Dressing Status Clean, dry, & intact 7/25/2017  1:22 PM   Dressing Type Transparent;Tape 7/25/2017  1:22 PM   Hub Color/Line Status Pink; Infusing 7/25/2017  1:22 PM        Opportunity for questions and clarification was provided.       Patient transported with:   O2 @ 3 liters  Registered Nurse  [facility]

## 2017-07-25 NOTE — ROUTINE PROCESS
Mobility Intervention:       [] Pt dangled at edge of bed    [] Pt assisted OOB to bedside commode    [] Pt assisted OOB to chair    [] Pt ambulated to bathroom    [x] Patient was ambulated in room/hallway    Assistive Device Utilized (check all that apply):       [] Rolling walker   [] Crutches   [] Straight Cane   [] Knee immobilizer   [x] IV pole    After Mobilization:     [x] Patient left in no apparent distress sitting up in chair  [] Patient left in no apparent distress in bed  [x] Call bell left within reach  Assistive Device:        [x] RN notified  [x] Caregiver present  [] Bed alarm activated    Comments:

## 2017-07-25 NOTE — H&P (VIEW-ONLY)
Preop History and Physical Exam:    El Schmitt is a 44 y.o. female who comes into the office today after completing the entirety of the bariatric preoperative protocol satisfactorily. she initially identified obesity at the age of 24 and at age 24 weighed 175lbs. she has tried a variety of unsupervised weight-loss attempts, but has yet to meet with lasting success. Today, the patient is Height: 5' 9\" (175.3 cm) tall, Weight: 150.6 kg (332 lb) lbs for a Body mass index is 49.03 kg/(m^2). It is due to their severe obesity, which is further complicated by hypertension, hyperlipidemia, obstructive sleep apnea - clinical, weight related arthopathies and hypovitaminosis D  that the patient is now seeking out bariatric surgery, specifically, the gastric bypass      Past Medical History:   Diagnosis Date    ADD (attention deficit disorder) 14 y/o     d/hanny meds 25 y/o    Cyst of left breast 3/15     6 month  for ff-up 9/15    Endometriosis     HTN (hypertension) 3/4/2010    Irregular menses     Kidney stone 2007    Migraine 3/4/2010    prn excedrine migraine OTC, trigger heat/dehydration    Morbid obesity ([facility] [address]     BRIAN on CPAP     Pelvic cramping     Prediabetes        Past Surgical History:   Procedure Laterality Date    HX GYN  7/2002    endometriosis, lysis of adhesions d and c       Current Outpatient Prescriptions   Medication Sig Dispense Refill    hydroCHLOROthiazide (HYDRODIURIL) 25 mg tablet TAKE 1 TAB BY MOUTH DAILY. 90 Tab 2    labetalol (NORMODYNE) 200 mg tablet TAKE 1 TABLET BY MOUTH TWICE A  Tab 2    cholecalciferol, VITAMIN D3, (VITAMIN D3) 5,000 unit tab tablet Take  by mouth daily.  multivitamin with iron tablet Take 1 Tab by mouth daily.  loratadine (CLARITIN) 10 mg tablet Take 1 Tab by mouth daily.  30 Tab 11       No Known Allergies    Social History   Substance Use Topics    Smoking status: Former Smoker     Packs/day: 0.25     Quit date: 8/1/2002    Smokeless tobacco: Never Used      Comment: started 20y/o    Alcohol use Yes      Comment: states rare       Family History   Problem Relation Age of Onset    Alcohol abuse Mother     Ovarian Cancer Mother 54    Headache Mother     Osteoporosis Mother     Headache Father     Hypertension Father     Diabetes Father     Osteoporosis Maternal Grandmother     Cancer Maternal Grandmother     Hypertension Maternal Grandfather     Headache Paternal Grandmother     Arthritis-osteo Paternal Grandmother     Diabetes Paternal Grandfather     Stroke Paternal Grandfather      Family Status   Relation Status    Mother     Father     Maternal Grandmother     Maternal Grandfather     Paternal Grandmother     Paternal Grandfather        Review of Systems:  Positive in BOLD     CONST: Fever, weight loss, fatigue or chills  GI: Nausea, vomiting, abdominal pain, change in bowel habits, hematochezia, melena, and GERD   INTEG: Dermatitis, abnormal moles  HEENT: Recent changes in vision, vertigo, epistaxis, dysphagia and hoarseness  CV: Chest pain, palpitations, HTN, edema and varicosities  RESP: Cough, shortness of breath, wheezing, hemoptysis, snoring and reactive airway disease  : Hematuria, dysuria, frequency, urgency, nocturia and stress urinary incontinence   MS: Weakness, joint pain and arthritis  ENDO: Pre-Diabetes, thyroid disease, polyuria, polydipsia, polyphagia, poor wound healing, heat intolerance, cold intolerance  LYMPH/HEME: Anemia, bruising and history of blood transfusions  NEURO: Dizziness, headache, fainting, seizures and stroke  PSYCH: Anxiety and depression    Physical Exam    Visit Vitals    Resp 16    Ht 5' 9\" (1.753 m)    Wt 150.6 kg (332 lb)    LMP 06/18/2017    BMI 49.03 kg/m2         General: Well developed, well nourished 44 y.o.) female in no acute distress - gynecoid morbid obesity  Head: Normocephalic, atraumatic  Mouth: Clear, no overt lesions, oral mucosa pink and moist  Neck: Supple, no masses, no adenopathy or carotid bruits, trachea midline  Resp: Clear to auscultation bilaterally, now wheeze, rhonchi, or rales, excursions normal and symmetrical  Cardio: Regular rate and rhythm, no murmurs, clicks, gallops, or rubs. No edema or varicosities. Abdomen: Obese, soft, nontender, nondistended, normoactive bowel sounds, no hernias, no hepatosplenomegaly, easily palpable costal margins, gynecoid distribution  Extremities: Warm, well perfused, no tenderness or swelling, normal gait/station, healed scar to right lower leg  Neuro: Sensation and strength grossly intact and symmetrical  Psych: Alert and oriented to person, place, and time. Studies:    LABS: within normal limits except for hypovitaminosis D  EKG: without significant abnormalities  NUTRITIONAL EVALUATION has deemed her a good candidate for weight loss surgery  PSYCH EVAL: deems her a good candidate    Impression:    Galindo Knox is a 44 y.o. female who is suffering from morbid obesity and comorbidities including hypertension, hyperlipidemia, obstructive sleep apnea - clinical and weight related arthopathieswho would benefit from bariatric surgery. We've discussed the restrictive and malabsorptive nature of the gastric bypass. The patient understands the likelihood of losing approximately 80% of their excess weight in 12 to 18 months. She also understands the risks including but not limited to bleeding, infection, need for reoperation, anastomotic ulcers, leaks and strictures, bowel obstruction secondary to adhesions and internal hernias, DVT, PE, heart attack, stroke, and death. Patient also understands risks of inadequate weight loss, excess weight loss, vitamin insufficiency, protein malnutrition, excess skin, and loss of hair.   We have reviewed the components of a successful postoperative course including requirement for a high protein, low carbohydrate diet, 60 oz a day of zero calorie liquids, daily vitamin supplementation, daily exercise, regular follow-up, and participation in support groups. We have reviewed the preoperative liver shrinking clear liquid diet, as well as reviewed any medication changes required while on the clear liquid diet. In addition, the patient understands that all medications to be taken during the first 8 weeks postoperatively are to be crushed and must therefore be in immediate release formulations. They further understand that it is their responsibility to review these and verify with their primary care doctor and pharmacist that all medications are crushable. We will schedule them for laparoscopic gastric bypass without liver biopsy in the near future.

## 2017-07-25 NOTE — ANESTHESIA PREPROCEDURE EVALUATION
Anesthetic History   No history of anesthetic complications            Review of Systems / Medical History  Patient summary reviewed and pertinent labs reviewed    Pulmonary        Sleep apnea: No treatment           Neuro/Psych   Within defined limits           Cardiovascular    Hypertension: well controlled                   GI/Hepatic/Renal  Within defined limits              Endo/Other        Morbid obesity     Other Findings   Comments:   Risk Factors for Postoperative nausea/vomiting:       History of postoperative nausea/vomiting? NO       Female? YES       Motion sickness? NO       Intended opioid administration for postoperative analgesia? YES      Smoking Abstinence  Current Smoker? NO  Elective Surgery? YES  Seen preoperatively by anesthesiologist or proxy prior to day of surgery? YES  Pt abstained from smoking 24 hours prior to anesthesia?  N/A           Physical Exam    Airway  Mallampati: II  TM Distance: 4 - 6 cm  Neck ROM: normal range of motion   Mouth opening: Normal     Cardiovascular               Dental  No notable dental hx       Pulmonary                 Abdominal  GI exam deferred       Other Findings            Anesthetic Plan    ASA: 3  Anesthesia type: general          Induction: Intravenous  Anesthetic plan and risks discussed with: Patient

## 2017-07-26 VITALS
BODY MASS INDEX: 43.4 KG/M2 | DIASTOLIC BLOOD PRESSURE: 72 MMHG | WEIGHT: 293 LBS | HEIGHT: 69 IN | TEMPERATURE: 97.8 F | RESPIRATION RATE: 16 BRPM | HEART RATE: 82 BPM | SYSTOLIC BLOOD PRESSURE: 116 MMHG | OXYGEN SATURATION: 99 %

## 2017-07-26 LAB
ANION GAP BLD CALC-SCNC: 9 MMOL/L (ref 3–18)
BUN SERPL-MCNC: 7 MG/DL (ref 7–18)
BUN/CREAT SERPL: 11 (ref 12–20)
CALCIUM SERPL-MCNC: 8.3 MG/DL (ref 8.5–10.1)
CHLORIDE SERPL-SCNC: 105 MMOL/L (ref 100–108)
CO2 SERPL-SCNC: 24 MMOL/L (ref 21–32)
CREAT SERPL-MCNC: 0.66 MG/DL (ref 0.6–1.3)
ERYTHROCYTE [DISTWIDTH] IN BLOOD BY AUTOMATED COUNT: 13.2 % (ref 11.6–14.5)
GLUCOSE SERPL-MCNC: 116 MG/DL (ref 74–99)
HCT VFR BLD AUTO: 35.7 % (ref 35–45)
HGB BLD-MCNC: 11.7 G/DL (ref 12–16)
MCH RBC QN AUTO: 27.9 PG (ref 24–34)
MCHC RBC AUTO-ENTMCNC: 32.8 G/DL (ref 31–37)
MCV RBC AUTO: 85 FL (ref 74–97)
PLATELET # BLD AUTO: 357 K/UL (ref 135–420)
PMV BLD AUTO: 9.1 FL (ref 9.2–11.8)
POTASSIUM SERPL-SCNC: 4 MMOL/L (ref 3.5–5.5)
RBC # BLD AUTO: 4.2 M/UL (ref 4.2–5.3)
SODIUM SERPL-SCNC: 138 MMOL/L (ref 136–145)
WBC # BLD AUTO: 11.4 K/UL (ref 4.6–13.2)

## 2017-07-26 PROCEDURE — 80048 BASIC METABOLIC PNL TOTAL CA: CPT | Performed by: SURGERY

## 2017-07-26 PROCEDURE — 74011250636 HC RX REV CODE- 250/636: Performed by: SURGERY

## 2017-07-26 PROCEDURE — 85027 COMPLETE CBC AUTOMATED: CPT | Performed by: SURGERY

## 2017-07-26 PROCEDURE — 36415 COLL VENOUS BLD VENIPUNCTURE: CPT | Performed by: SURGERY

## 2017-07-26 PROCEDURE — 74011250637 HC RX REV CODE- 250/637: Performed by: SURGERY

## 2017-07-26 RX ORDER — ONDANSETRON 4 MG/1
4 TABLET, ORALLY DISINTEGRATING ORAL
Qty: 30 TAB | Refills: 0 | Status: SHIPPED | OUTPATIENT
Start: 2017-07-26 | End: 2017-08-09 | Stop reason: ALTCHOICE

## 2017-07-26 RX ORDER — OXYCODONE HYDROCHLORIDE 5 MG/1
5 TABLET ORAL
Qty: 30 TAB | Refills: 0 | Status: SHIPPED | OUTPATIENT
Start: 2017-07-26 | End: 2017-08-09 | Stop reason: ALTCHOICE

## 2017-07-26 RX ORDER — ENOXAPARIN SODIUM 100 MG/ML
40 INJECTION SUBCUTANEOUS DAILY
Qty: 7 SYRINGE | Refills: 0 | Status: SHIPPED
Start: 2017-07-26 | End: 2017-08-03

## 2017-07-26 RX ORDER — ONDANSETRON 4 MG/1
4 TABLET, ORALLY DISINTEGRATING ORAL
Status: DISCONTINUED | OUTPATIENT
Start: 2017-07-26 | End: 2017-07-26 | Stop reason: HOSPADM

## 2017-07-26 RX ADMIN — ACETAMINOPHEN 650 MG: 650 SOLUTION ORAL at 13:41

## 2017-07-26 RX ADMIN — LABETALOL HYDROCHLORIDE 200 MG: 200 TABLET, FILM COATED ORAL at 09:42

## 2017-07-26 RX ADMIN — KETOROLAC TROMETHAMINE 30 MG: 30 INJECTION, SOLUTION INTRAMUSCULAR at 11:17

## 2017-07-26 RX ADMIN — KETOROLAC TROMETHAMINE 30 MG: 30 INJECTION, SOLUTION INTRAMUSCULAR at 06:40

## 2017-07-26 RX ADMIN — ACETAMINOPHEN 650 MG: 650 SOLUTION ORAL at 09:42

## 2017-07-26 RX ADMIN — ACETAMINOPHEN 650 MG: 650 SOLUTION ORAL at 03:38

## 2017-07-26 RX ADMIN — ACETAMINOPHEN 650 MG: 650 SOLUTION ORAL at 00:54

## 2017-07-26 RX ADMIN — KETOROLAC TROMETHAMINE 30 MG: 30 INJECTION, SOLUTION INTRAMUSCULAR at 00:54

## 2017-07-26 RX ADMIN — SODIUM CHLORIDE, SODIUM LACTATE, POTASSIUM CHLORIDE, AND CALCIUM CHLORIDE 150 ML/HR: 600; 310; 30; 20 INJECTION, SOLUTION INTRAVENOUS at 03:33

## 2017-07-26 RX ADMIN — ENOXAPARIN SODIUM 40 MG: 40 INJECTION SUBCUTANEOUS at 09:42

## 2017-07-26 NOTE — PROGRESS NOTES
Surgery Progress Note    7/26/2017    Admit Date: 7/25/2017    Subjective:     Patient has complaints of none Pain is controlled with APAP only. Patient has been ambulating in halls. She reports no nausea or vomiting. Bowel Movements: None    Objective:     Blood pressure 130/86, pulse 83, temperature 98.1 °F (36.7 °C), resp. rate 17, height 5' 9\" (1.753 m), weight 149.7 kg (330 lb), last menstrual period 06/18/2017, SpO2 98 %, not currently breastfeeding.    07/26 0701 - 07/26 1900  In: 180 [P.O.:180]  Out: 350 [Urine:350]    07/24 1901 - 07/26 0700  In: 4077.5 [P.O.:195; I.V.:3882.5]  Out: 2438 [Urine:3050]    EXAM: GENERAL: alert, pleasant, no distress   HEART: regular rate and rhythm   LUNGS: clear to auscultation   ABDOMEN:  Soft, obese, non tender , non distended, all incisions clean, dry, no erythema or drainage   EXTREMITIES: warm, well perfused    Data Review    Recent Results (from the past 24 hour(s))   CBC W/O DIFF    Collection Time: 07/26/17  2:20 AM   Result Value Ref Range    WBC 11.4 4.6 - 13.2 K/uL    RBC 4.20 4. 20 - 5.30 M/uL    HGB 11.7 (L) 12.0 - 16.0 g/dL    HCT 35.7 35.0 - 45.0 %    MCV 85.0 74.0 - 97.0 FL    MCH 27.9 24.0 - 34.0 PG    MCHC 32.8 31.0 - 37.0 g/dL    RDW 13.2 11.6 - 14.5 %    PLATELET 431 004 - 167 K/uL    MPV 9.1 (L) 9.2 - 46.3 FL   METABOLIC PANEL, BASIC    Collection Time: 07/26/17  2:20 AM   Result Value Ref Range    Sodium 138 136 - 145 mmol/L    Potassium 4.0 3.5 - 5.5 mmol/L    Chloride 105 100 - 108 mmol/L    CO2 24 21 - 32 mmol/L    Anion gap 9 3.0 - 18 mmol/L    Glucose 116 (H) 74 - 99 mg/dL    BUN 7 7.0 - 18 MG/DL    Creatinine 0.66 0.6 - 1.3 MG/DL    BUN/Creatinine ratio 11 (L) 12 - 20      GFR est AA >60 >60 ml/min/1.73m2    GFR est non-AA >60 >60 ml/min/1.73m2    Calcium 8.3 (L) 8.5 - 10.1 MG/DL       Assessment:   Mary Fontenot is a 44 y.o. female,  day 1 status post laparoscopic gastric bypass.   Condition: good    Plan:     -Advance to bariatric clears and protein supplements (already started and tolerating well)  -Ambulate ad fausto  -ok for d/c to home later today if meets PO goals  JEANNINE PascualC

## 2017-07-26 NOTE — DISCHARGE INSTRUCTIONS
DISCHARGE SUMMARY from Nurse    The following personal items are in your possession at time of discharge:    Dental Appliances: None  Visual Aid: At bedside, Glasses     Home Medications: None  Jewelry: None  Clothing: Dress, Undergarments  Other Valuables: Cell Phone             PATIENT INSTRUCTIONS:    After general anesthesia or intravenous sedation, for 24 hours or while taking prescription Narcotics:  · Limit your activities  · Do not drive and operate hazardous machinery  · Do not make important personal or business decisions  · Do  not drink alcoholic beverages  · If you have not urinated within 8 hours after discharge, please contact your surgeon on call. Report the following to your surgeon:  · Excessive pain, swelling, redness or odor of or around the surgical area  · Temperature over 100.5  · Nausea and vomiting lasting longer than 4 hours or if unable to take medications  · Any signs of decreased circulation or nerve impairment to extremity: change in color, persistent  numbness, tingling, coldness or increase pain  · Any questions        What to do at Home:  Recommended activity: Activity as tolerated. *  Please give a list of your current medications to your Primary Care Provider. *  Please update this list whenever your medications are discontinued, doses are      changed, or new medications (including over-the-counter products) are added. *  Please carry medication information at all times in case of emergency situations. These are general instructions for a healthy lifestyle:    No smoking/ No tobacco products/ Avoid exposure to second hand smoke    Surgeon General's Warning:  Quitting smoking now greatly reduces serious risk to your health.     Obesity, smoking, and sedentary lifestyle greatly increases your risk for illness    A healthy diet, regular physical exercise & weight monitoring are important for maintaining a healthy lifestyle    You may be retaining fluid if you have a history of heart failure or if you experience any of the following symptoms:  Weight gain of 3 pounds or more overnight or 5 pounds in a week, increased swelling in our hands or feet or shortness of breath while lying flat in bed. Please call your doctor as soon as you notice any of these symptoms; do not wait until your next office visit. Recognize signs and symptoms of STROKE:    F-face looks uneven    A-arms unable to move or move unevenly    S-speech slurred or non-existent    T-time-call 911 as soon as signs and symptoms begin-DO NOT go       Back to bed or wait to see if you get better-TIME IS BRAIN. Warning Signs of HEART ATTACK     Call 911 if you have these symptoms:   Chest discomfort. Most heart attacks involve discomfort in the center of the chest that lasts more than a few minutes, or that goes away and comes back. It can feel like uncomfortable pressure, squeezing, fullness, or pain.  Discomfort in other areas of the upper body. Symptoms can include pain or discomfort in one or both arms, the back, neck, jaw, or stomach.  Shortness of breath with or without chest discomfort.  Other signs may include breaking out in a cold sweat, nausea, or lightheadedness. Don't wait more than five minutes to call 911 - MINUTES MATTER! Fast action can save your life. Calling 911 is almost always the fastest way to get lifesaving treatment. Emergency Medical Services staff can begin treatment when they arrive -- up to an hour sooner than if someone gets to the hospital by car. The discharge information has been reviewed with the patient. The patient verbalized understanding. Discharge medications reviewed with the patient and appropriate educational materials and side effects teaching were provided. Respi Activation    Thank you for requesting access to Respi. Please follow the instructions below to securely access and download your online medical record.  Respi allows you to send messages to your doctor, view your test results, renew your prescriptions, schedule appointments, and more. How Do I Sign Up? 1. In your internet browser, go to [email or web address] reviews  2. Click on the First Time User? Click Here link in the Sign In box. You will be redirect to the New Member Sign Up page. 3. Enter your Embarkly Access Code exactly as it appears below. You will not need to use this code after youve completed the sign-up process. If you do not sign up before the expiration date, you must request a new code. MyChart Access Code: Activation code not generated  Current Embarkly Status: Active (This is the date your Embarkly access code will )    4. Enter the last four digits of your Social Security Number (xxxx) and Date of Birth (mm/dd/yyyy) as indicated and click Submit. You will be taken to the next sign-up page. 5. Create a Embarkly ID. This will be your Embarkly login ID and cannot be changed, so think of one that is secure and easy to remember. 6. Create a Embarkly password. You can change your password at any time. 7. Enter your Password Reset Question and Answer. This can be used at a later time if you forget your password. 8. Enter your e-mail address. You will receive e-mail notification when new information is available in [address] 9. Click Sign Up. You can now view and download portions of your medical record. 10. Click the Download Summary menu link to download a portable copy of your medical information. Additional Information    If you have questions, please visit the Frequently Asked Questions section of the Embarkly website at [email or web address] Remember, Embarkly is NOT to be used for urgent needs. For medical emergencies, dial 911.     Patient armband removed and shredded

## 2017-07-26 NOTE — PROGRESS NOTES
07/26/17 4399 07/26/17 0341   Patient Observation   Observations pt medicated with scheduled pain med call bell within reach ice pack to bad melisa bell withi nreach

## 2017-07-26 NOTE — ROUTINE PROCESS
Bedside and Verbal shift change report given to [address] [place] [address] (oncoming nurse) by Xiomara Martin RN   (offgoing nurse). Report included the following information SBAR, Kardex, Procedure Summary, Intake/Output, MAR and Recent Results.

## 2017-07-26 NOTE — PROGRESS NOTES
Care Management Interventions  PCP Verified by CM: Yes (last seen a \"month ago\")  Mode of Transport at Discharge: Other (see comment) (family)  Transition of Care Consult (CM Consult): Discharge Planning  Physical Therapy Consult: No  Occupational Therapy Consult: No  Current Support Network: Lives with Spouse  Confirm Follow Up Transport: Family  Plan discussed with Pt/Family/Caregiver: Yes  Discharge Location  Discharge Placement: Home    Pt is a 44year old admitted for morbid obesity. Pt is alert and oriented and alone in room. Pt reports that she lives with her  and children but that she plans to go to her grandparents house for a couple days due to her  is out of town. Pt reports that prior to admission she was independent in her ADLs and the that her  has had back surgery so he has a walker and raised toilet seat that he no longer uses. Pt reports that she is being discharge home today. Pt reports that she has transportation home with family.

## 2017-07-26 NOTE — PROGRESS NOTES
Mobility Intervention:       [] Pt dangled at edge of bed    [] Pt assisted OOB to bedside commode    [] Pt assisted OOB to chair    [] Pt ambulated to bathroom    [x] Patient was ambulated in room/hallway    Assistive Device Utilized (check all that apply):       [] Rolling walker   [] Crutches   [] Straight Cane   [] Knee immobilizer   [x] IV pole    After Mobilization:     [x] Patient left in no apparent distress sitting up in chair  [] Patient left in no apparent distress in bed  [] Call bell left within reach  Assistive Device:        [] RN notified  [] Caregiver present  [] Bed alarm activated    Comments:    Tolerating well

## 2017-07-26 NOTE — NURSE NAVIGATOR
Post op diet progression discussed with patient. Patient to be discharged on a bariatric clear liquid diet. Patient verbalizes understanding of bariatric clear liquid and bariatric soft and moist diet. All of the patients questions and concerns have been addressed prior to discharge. Patient to follow up with surgeon in 7-14 days. Lovenox self injection instructions given. General Care after Surgery    1. No lifting over 15 lbs for 4 weeks. 2. No driving while taking the pain medication (approximately 7-10 days). 3. No tub baths, swimming or hot tubs until incisions are healed. (about 2 weeks)    4. You may shower. Clean incisions daily /gently with soap and check incisions for signs of infection:   Redness around incision   Swelling at site   Drainage with an foul odor (pus)   Increase tenderness around incision    5. Take your temperature and resting pulse in the morning and evening. Record on tracking form given to you. Call if your temperature is greater than 101 or your pulse rate is greater than 115.    6. Please contact your surgeon if you are having excessive abdominal pain (that lasts longer than 4 hours and does not improve with prescribed pain medication), vomiting or shortness of breath. 7. Get up and move - do not sit in one place for more than an hour. 8. You need to WALK (EXERCISE) for 30 minutes per day. (Walking around your house does not count)      a. Bike, treadmill and elliptical are OK  b. NO weight lifting or sit ups    9. If constipated take an adult dose of Miralax (available over the counter). Contact the doctors office if Miralax doesnt help. 10.  You may swallow pills starting the day after surgery as long as they fit inside this [place]:      11.  Continue to use your incentive spirometer (breathing machine) for the next couple of weeks to help prevent pneumonia. Phone numbers:   [facility] [place] [phone]  [facility] at [place] [address]  [facility] phone #: [phone]  [facility] phone# [phone]   [facility] phone#: [phone]  [facility] phone#: [phone]    Key Diet Principles Following Bariatric Surgery     1. Begin each meal with soft moist high protein foods (i.e. chicken, turkey, yogurt, tuna, eggs, cottage cheese, other fish and seafood). 2.  Consume a minimum of 64 oz. of fluid each day to prevent dehydration. No straws. 3.  No food and fluid together. Stop drinking 30 minutes before a meal.  You may begin fluids again 30 minutes after you finish a meal.    4.  Eat very slowly and chew all foods completely. 6.  Keep portions small. 7.  No simple sugars or high fat foods. No carbonated beverages. No Caffeine. 8.  Eat 3 meals per day. No skipping. Avoid snacking between meals. 9.  No alcohol. No Smoking. 10. Two Capron Complete Chewable vitamins each day. Take one in the morning and one at night. 11. 1500 mg Calcium Citrate per day in separate dosages      12. Vitamin D 3: 5000 IU taken per day. 13. Vitamin B-12:  Take 1000 mcg sublingually daily    14. Iron: 60 mg per day from Bariatric [employer]vantage      15. Protein supplements of your choice. Must be low sugar (0-3 g), low fat    (0-3 g) and provide at least 35-40 g of protein each day. You need a total     (food + supplements) of 60 - 70 grams of protein each day     16. Minimum of 30 minutes of physical activity daily. Delroy Day Gastric Bypass and Sleeve Dietary Progression    Patient Name:   Date of Surgery: 7/25/17     Ice Chips start once admitted on floor. Begin Bariatric Clear Liquid Diet on: 7/26/17    Clear Liquid Diet: 64 oz. of fluid per day  o Low calorie, low sugar, non-carbonated beverages  - Water, Crystal Light, Propel Water, Sugar Free Jell-O, Sugar Free Popsicles, Bouillon  - Start protein supplement during this stage.  (60-70 grams per day)  - Getting your fluid in and staying hydrated is your #1 priority! - The clear liquid diet will last for 7 days. Begin Bariatric Soft and Moist on: 8/2/17  - This stage of the diet will last for 5 weeks, unless otherwise instructed by your surgeon. - Begin:  1 week post-op   - End:  6 weeks post-op (or when you follow up with the Registered Dietitian)    - Soft, moist, high protein foods: 3 meals per day plus protein supplements. o   Portions should emphasize on soft protein. o Portions will be a MAXIMUM of:  o  1 ounce of solid food  o  2-3 ounces of cottage cheese and yogurt. o Protein supplements should be between meals and provide 30-40 grams per day during soft protein diet. o Continue to get 64 ounces of fluid in per day. - Protein foods that are ok on the Soft and Moist Diet include:  o Slow transition:  o 1st week on soft protein should focus on: Yogurt, cottage cheese, eggs  o 2nd -4th  week on soft protein diet should focus on: yogurt, cottage cheese, eggs, canned tuna, canned chicken, tilapia, fish (needs to be soft enough to be cut up with a fork)  o 5th week on soft protein diet should focus on: Yogurt, cottage cheese, eggs, canned tuna, canned chicken, tilapia, fish, salmon, chicken breast, or turkey. Remember to continue to get 64 ounces of fluid daily on ALL Stages. To be advanced to Bariatric Maintenance Stage of the bariatric diet, follow up with the dietitian 6 weeks post-op, around:      TEMPERATURE/HEART RATE LOG  WEEK 1  Day Date Morning temperature Morning heart rate Evening temperature Evening heart rate   1        2        3        4        5        6        7          WEEK 2  Day Date Morning temperature Morning heart rate Evening temperature Evening heart rate   1        2        3        4        5        6        7          Instructions: Take your temperature and heart rate (pulse) twice a day for 14 days.   Take both in the morning and evening at about the same time each day (when you wake up and before you go to bed when you are relaxed)  Please contact your doctors office if your:  ? Temperature is higher than 101°  ? Heart rate (pulse) is higher than 115 beats per minute    (normal heart rate is  beats per minute)    [place]  [phone]  [facility]:    [phone]  Dr. Styles Amel   [phone]      HOW TO TAKE YOUR HEART RATE (PULSE)         How to do it:  1. Turn your left hand so that your palm is face-up. 2. With the index and middle fingers of your right hand, draw a line from the base of your thumb to just below the crease in your wrist. Your fingers should nestle just to the left of the large tendon that pops up when you bend your wrist toward you. 3. Dont press too hard, that will make the pulse go away. Use gentle pressure. 4. Wait. It can take several seconds--and several micro-adjustments in the placement of your two fingers on your wrist--to find your pulse. Just keep moving your fingers down or up your wrist in small increments (and pausing for a few seconds) until you find it. 5. To take your pulse rate:  1. Find a watch with a second hand and place it on your right wrist or on the table next to your left hand. 2. After finding your pulse, count the number of beats for 20 seconds. 3. Multiply by 3 to get your heart rate, or beats per minute (or just count for 60 seconds for a math-free option). 4. Normal, resting heart rate is about  beats per minute.

## 2017-07-26 NOTE — PROGRESS NOTES
07/25/17 2052 07/25/17 2108 07/25/17 2112   Patient Observation   Observations pt c/o pain scheduled pain med given call bell withi nreach enc ICS       07/25/17 2116   Patient Observation   Observations ice pack for abd;pt ambulating in armenta

## 2017-07-26 NOTE — PROGRESS NOTES
07/26/17 0055 07/26/17 0120 07/26/17 0132   Patient Observation   Observations scheduled pAin meds given call bell within reach ice pack to abd call bell withi nrlianech;dc/d jr;pt up to try to void;call bell withi nreach pt walking in armenta with spuse

## 2017-08-01 ENCOUNTER — TELEPHONE (OUTPATIENT)
Dept: SURGERY | Age: 37
End: 2017-08-01

## 2017-08-03 ENCOUNTER — OFFICE VISIT (OUTPATIENT)
Dept: FAMILY MEDICINE CLINIC | Age: 37
End: 2017-08-03

## 2017-08-03 VITALS
HEIGHT: 69 IN | SYSTOLIC BLOOD PRESSURE: 124 MMHG | TEMPERATURE: 98 F | WEIGHT: 293 LBS | OXYGEN SATURATION: 98 % | HEART RATE: 92 BPM | RESPIRATION RATE: 16 BRPM | DIASTOLIC BLOOD PRESSURE: 84 MMHG | BODY MASS INDEX: 43.4 KG/M2

## 2017-08-03 DIAGNOSIS — E55.9 VITAMIN D DEFICIENCY: ICD-10-CM

## 2017-08-03 DIAGNOSIS — I10 ESSENTIAL HYPERTENSION: Primary | ICD-10-CM

## 2017-08-03 DIAGNOSIS — R73.03 PREDIABETES: ICD-10-CM

## 2017-08-03 DIAGNOSIS — Z98.84 S/P GASTRIC BYPASS: ICD-10-CM

## 2017-08-03 NOTE — PROGRESS NOTES
Mireya Crabtree, 44 y.o.,  female    SUBJECTIVE  Ff-up hospitalization lap gastric bypass 7/26/17    Pt doing well post op, pain is tolerable, has been 5 days since taking analgesics. Tolerating solid food now, with mild nausea    HTN- continues to take labetalol. Patient also has prediabetes, c/w FBG in the hospital.     Vit d def- has resumed replacement vitamins    ROS:  See HPI, all others negative        Patient Active Problem List   Diagnosis Code    Migraine G43.909    HTN (hypertension) I10    Hypercholesteremia E78.00    Morbid obesity ([address] E66.01    Endometriosis N80.9    Breast cyst N60.09    Borderline high cholesterol E78.9    AZAEL (stress urinary incontinence, female) N39.3    Hypersomnolence G47.10    Chronic fatigue R53.82    Hypovitaminosis D E55.9    BRIAN (obstructive sleep apnea) G47.33    BMI 50.0-59.9, adult ([facility]) Z68.43    Prediabetes R73.03    BRIAN on CPAP G47.33, Z99.89       Current Outpatient Prescriptions   Medication Sig Dispense Refill    ondansetron (ZOFRAN ODT) 4 mg disintegrating tablet Take 1 Tab by mouth every six (6) hours as needed. Indications: PREVENTION OF POST-OPERATIVE NAUSEA AND VOMITING 30 Tab 0    oxyCODONE IR (ROXICODONE) 5 mg immediate release tablet Take 1 Tab by mouth every six (6) hours as needed. Max Daily Amount: 20 mg. 30 Tab 0    labetalol (NORMODYNE) 200 mg tablet TAKE 1 TABLET BY MOUTH TWICE A  Tab 2    cholecalciferol, VITAMIN D3, (VITAMIN D3) 5,000 unit tab tablet Take  by mouth daily.  multivitamin with iron tablet Take 1 Tab by mouth daily.  loratadine (CLARITIN) 10 mg tablet Take 1 Tab by mouth daily.  30 Tab 11       No Known Allergies    Past Medical History:   Diagnosis Date    ADD (attention deficit disorder) 14 y/o     d/hanny meds 23 y/o    Cyst of left breast 3/15     6 month  for ff-up 9/15    Endometriosis     HTN (hypertension) 3/4/2010    Irregular menses     Kidney stone 2007    Migraine 3/4/2010    prn excedrine migraine OTC, trigger heat/dehydration    Morbid obesity ([address]     BRIAN on CPAP     NOT USING CPAP    Pelvic cramping     Prediabetes        Social History     Social History    Marital status:      Spouse name: N/A    Number of children: N/A    Years of education: N/A     Occupational History    Not on file. Social History Main Topics    Smoking status: Former Smoker     Packs/day: 0.25     Quit date: 8/1/2002    Smokeless tobacco: Never Used      Comment: started 22y/o    Alcohol use Yes      Comment: states rare    Drug use: No    Sexual activity: Yes     Partners: Male     Birth control/ protection: Condom     Other Topics Concern    Not on file     Social History Narrative       Family History   Problem Relation Age of Onset    Alcohol abuse Mother     Ovarian Cancer Mother 54    Headache Mother     Osteoporosis Mother     Headache Father     Hypertension Father     Diabetes Father     Osteoporosis Maternal Grandmother     Cancer Maternal Grandmother     Hypertension Maternal Grandfather     Headache Paternal Grandmother     Arthritis-osteo Paternal Grandmother     Diabetes Paternal Grandfather     Stroke Paternal Grandfather          OBJECTIVE    Physical Exam:     Visit Vitals    BP (!) 150/98 (BP 1 Location: Left arm, BP Patient Position: Sitting)    Pulse (!) 114    Temp 98 °F (36.7 °C) (Oral)    Resp 16    Ht 5' 9\" (1.753 m)    Wt 308 lb (139.7 kg)    SpO2 98%    BMI 45.48 kg/m2       General: alert, well-appearing, obese,  in no apparent distress or pain  Neck: supple, no adenopathy palpated  CVS: normal rate, regular rhythm, distinct S1 and S2  Lungs:clear to ausculation bilaterally, no crackles, wheezing or rhonchi noted  Abdomen: normoactive bowel sounds, soft, non-tender, laparoscopic wounds appear clean and dry.    Extremities: no edema, no cyanosis,  Skin: warm, no lesions, rashes noted  Psych:  mood and affect normal        ASSESSMENT/PLAN  Diagnoses and all orders for this visit:    1. Essential hypertension  Advised to check BP, if trending low over time with weight loss, will trial decrease dose to     2. Prediabetes    3. S/P gastric bypass    4. Vitamin D deficiency        Follow-up Disposition: Not on File      Patient understands plan of care. Patient has provided input and agrees with goals.

## 2017-08-03 NOTE — PROGRESS NOTES
Chief Complaint   Patient presents with   [facility] Follow Up     s/p gastric bypass 7/26

## 2017-08-09 ENCOUNTER — OFFICE VISIT (OUTPATIENT)
Dept: SURGERY | Age: 37
End: 2017-08-09

## 2017-08-09 VITALS
BODY MASS INDEX: 43.4 KG/M2 | DIASTOLIC BLOOD PRESSURE: 80 MMHG | HEART RATE: 92 BPM | HEIGHT: 69 IN | SYSTOLIC BLOOD PRESSURE: 120 MMHG | TEMPERATURE: 98.5 F | RESPIRATION RATE: 18 BRPM | WEIGHT: 293 LBS

## 2017-08-09 DIAGNOSIS — R79.89 LOW VITAMIN D LEVEL: ICD-10-CM

## 2017-08-09 DIAGNOSIS — K91.2 POSTOPERATIVE MALABSORPTION: Primary | ICD-10-CM

## 2017-08-09 RX ORDER — LANOLIN ALCOHOL/MO/W.PET/CERES
1000 CREAM (GRAM) TOPICAL DAILY
COMMUNITY
End: 2018-08-21

## 2017-08-09 RX ORDER — MICONAZOLE NITRATE 2 %
2 CREAM WITH APPLICATOR VAGINAL 3 TIMES DAILY
COMMUNITY
End: 2018-08-21

## 2017-08-09 NOTE — LETTER
8/9/2017 9:28 AM 
 
Patient:  Cathy Cruz YOB: 1980 Date of Visit: 8/9/2017 Tori Rizzo, [address] [id number] [place] [address] VIA In Basket Dear Tori Rizzo MD, Thank you for referring Ms. Cathy Cruz to [place] [address] for evaluation and treatment. Below are the relevant portions of my assessment and plan of care. Subjective:  
  
Cathy Cruz is a 44 y.o. female is now 2 weeks status post laparoscopic gastric bypass surgery. Doing well overall. Currently on a stage 3 diet without difficulty. Taking in 64oz water,  50g protein. 30 min of activity daily. Bowel movements are regular. The patient is not having any pain. Betha Lute The patient is compliant with multivitamins, calcium and B12 supplements. Weight Loss Metrics 8/9/2017 8/9/2017 8/3/2017 7/25/2017 6/29/2017 6/29/2017 5/5/2017 Pre op / Initial Wt 330 - - - 332 - - Today's Wt - 305 lb 308 lb 330 lb - 332 lb 333 lb BMI - 45.04 kg/m2 45.48 kg/m2 48.73 kg/m2 - 49.03 kg/m2 49.18 kg/m2 Ideal Body Wt 146 - - - 146 - - Excess Body Wt 184 - - - 186 - -  
Goal Wt 183 - - - 183 - - Wt loss to date 25 - - - 0 - -  
% Wt Loss 0.17 - - - 0 - -  
80% .2 - - - 148.8 - - Body mass index is 45.04 kg/(m^2). Comorbidities: Hypertension: improved Diabetes: not applicable Obstructive Sleep Apnea: improved Hyperlipidemia: improved Stress Urinary Incontinence: not applicable Gastroesophageal Reflux: not applicable Weight related arthropathy:not applicable Past Medical History:  
Diagnosis Date  ADD (attention deficit disorder) 14 y/o   
 d/hanny meds 23 y/o  Cyst of left breast 3/15  
  6 month  for ff-up 9/15  Endometriosis  HTN (hypertension) 3/4/2010  Irregular menses  Kidney stone 2007  Migraine 3/4/2010  
 prn excedrine migraine OTC, trigger heat/dehydration  Morbid obesity ([address]  BRIAN on CPAP   
 NOT USING CPAP  
 Pelvic cramping  Prediabetes Past Surgical History:  
Procedure Laterality Date  HX DILATION AND CURETTAGE    
 HX GI    
 GBP  
 HX LAP GASTRIC BYPASS  07/26/2017  HX PELVIC LAPAROSCOPY  7/2002  
 endometriosis, lysis of adhesions d and c  
 
 
Current Outpatient Prescriptions Medication Sig Dispense Refill  cyanocobalamin (VITAMIN B-12) 1,000 mcg tablet Take 1,000 mcg by mouth daily.  calcium citrate-vitamin D3 (CITRACAL WITH VITAMIN D MAXIMUM) tablet Take 2 Tabs by mouth three (3) times daily.  labetalol (NORMODYNE) 200 mg tablet TAKE 1 TABLET BY MOUTH TWICE A  Tab 2  cholecalciferol, VITAMIN D3, (VITAMIN D3) 5,000 unit tab tablet Take 5,000 Units by mouth daily.  loratadine (CLARITIN) 10 mg tablet Take 1 Tab by mouth daily. 30 Tab 11  
 multivitamin with iron tablet Take 1 Tab by mouth two (2) times a day. No Known Allergies Objective:  
 
Visit Vitals  /80  Pulse 92  Temp 98.5 °F (36.9 °C)  Resp 18  Ht 5' 9\" (1.753 m)  Wt 138.3 kg (305 lb)  BMI 45.04 kg/m2 General:  alert, cooperative, no distress, appears stated age Chest: no accessory muscle use Cor:   Regular rate and rhythm Abdomen: soft, bowel sounds active, non-tender Incision:   no hernia, incision well approximated Labs: none Assessment:  
 
Doing well postoperatively. Cut down on refried beans Plan:  
 
Increase activity to the goal of 30 minutes daily, Follow up labs as ordered, Increase fluids, Follow up with Registered Dietician and Continue MVI/Ca/B12 supplementation Follow up in 3 weeks Thank you very much for your referral of Ms. Noelle Azul. If you have questions, please do not hesitate to call me. I look forward to following Ms. Filiberto Rodriguez along with you and will keep you updated as to her progress.   
 
 
 
 
Sincerely, 
 
 
Dev Thomas MD

## 2017-08-09 NOTE — PROGRESS NOTES
Pt ID confirmed    Weight Loss Metrics 8/9/2017 8/9/2017 8/3/2017 7/25/2017 6/29/2017 6/29/2017 5/5/2017   Pre op / Initial Wt 330 - - - 332 - -   Today's Wt - 305 lb 308 lb 330 lb - 332 lb 333 lb   BMI - 45.04 kg/m2 45.48 kg/m2 48.73 kg/m2 - 49.03 kg/m2 49.18 kg/m2   Ideal Body Wt 146 - - - 146 - -   Excess Body Wt 184 - - - 186 - -   Goal Wt 183 - - - 183 - -   Wt loss to date 25 - - - 0 - -   % Wt Loss 0.17 - - - 0 - -   80% .2 - - - 148.8 - -       Body mass index is 45.04 kg/(m^2).     Post op medications:  MVI: 2x  Calcium Citrate: 1500 milligrams  Actigal 0  B-12 1000 micrograms  Vit D 3 5000 units

## 2017-08-09 NOTE — PROGRESS NOTES
Subjective:      Dee Teresa is a 44 y.o. female is now 2 weeks status post laparoscopic gastric bypass surgery. Doing well overall. Currently on a stage 3 diet without difficulty. Taking in 64oz water,  50g protein. 30 min of activity daily. Bowel movements are regular. The patient is not having any pain. .  The patient is compliant with multivitamins, calcium and B12 supplements. Weight Loss Metrics 8/9/2017 8/9/2017 8/3/2017 7/25/2017 6/29/2017 6/29/2017 5/5/2017   Pre op / Initial Wt 330 - - - 332 - -   Today's Wt - 305 lb 308 lb 330 lb - 332 lb 333 lb   BMI - 45.04 kg/m2 45.48 kg/m2 48.73 kg/m2 - 49.03 kg/m2 49.18 kg/m2   Ideal Body Wt 146 - - - 146 - -   Excess Body Wt 184 - - - 186 - -   Goal Wt 183 - - - 183 - -   Wt loss to date 25 - - - 0 - -   % Wt Loss 0.17 - - - 0 - -   80% .2 - - - 148.8 - -       Body mass index is 45.04 kg/(m^2).     Comorbidities:    Hypertension: improved  Diabetes: not applicable  Obstructive Sleep Apnea: improved  Hyperlipidemia: improved  Stress Urinary Incontinence: not applicable  Gastroesophageal Reflux: not applicable  Weight related arthropathy:not applicable        Past Medical History:   Diagnosis Date    ADD (attention deficit disorder) 14 y/o     d/hanny meds 25 y/o    Cyst of left breast 3/15     6 month US for ff-up 9/15    Endometriosis     HTN (hypertension) 3/4/2010    Irregular menses     Kidney stone 2007    Migraine 3/4/2010    prn excedrine migraine OTC, trigger heat/dehydration    Morbid obesity ([facility] [address]     BRIAN on CPAP     NOT USING CPAP    Pelvic cramping     Prediabetes        Past Surgical History:   Procedure Laterality Date    HX DILATION AND CURETTAGE      HX GI      GBP    HX LAP GASTRIC BYPASS  07/26/2017    HX PELVIC LAPAROSCOPY  7/2002    endometriosis, lysis of adhesions d and c       Current Outpatient Prescriptions   Medication Sig Dispense Refill    cyanocobalamin (VITAMIN B-12) 1,000 mcg tablet Take 1,000 mcg by mouth daily.  calcium citrate-vitamin D3 (CITRACAL WITH VITAMIN D MAXIMUM) tablet Take 2 Tabs by mouth three (3) times daily.  labetalol (NORMODYNE) 200 mg tablet TAKE 1 TABLET BY MOUTH TWICE A  Tab 2    cholecalciferol, VITAMIN D3, (VITAMIN D3) 5,000 unit tab tablet Take 5,000 Units by mouth daily.  loratadine (CLARITIN) 10 mg tablet Take 1 Tab by mouth daily. 30 Tab 11    multivitamin with iron tablet Take 1 Tab by mouth two (2) times a day. No Known Allergies      Objective:     Visit Vitals    /80    Pulse 92    Temp 98.5 °F (36.9 °C)    Resp 18    Ht 5' 9\" (1.753 m)    Wt 138.3 kg (305 lb)    BMI 45.04 kg/m2       General:  alert, cooperative, no distress, appears stated age   Chest: no accessory muscle use   Cor:   Regular rate and rhythm   Abdomen: soft, bowel sounds active, non-tender   Incision:   no hernia, incision well approximated       Labs: none    Assessment:     Doing well postoperatively.   Cut down on refried beans    Plan:     Increase activity to the goal of 30 minutes daily, Follow up labs as ordered, Increase fluids, Follow up with Registered Dietician and Continue MVI/Ca/B12 supplementation  Follow up in 3 weeks

## 2017-08-11 NOTE — DISCHARGE SUMMARY
Bariatric Surgery Discharge Progress Note    Admission Date: 7/25/2017    Discharge Date: 7/26/2017      Admission Diagnosis:    Clinically severe Obesity    Comorbidities:  hypertension, hyperlipidemia, obstructive sleep apnea - clinical and weight related arthopathies    Discharge Diagnosis:     Clinically Severe Obesity, s/p laparoscopic gastric bypass surgery with comorbidities as listed above    Procedures:   laparoscopic gastric bypass surgery    Postop Complications: none    Hospital Course:  Patient was admitted on 7/25/2017 for scheduled bariatric surgery. Operation was without significant complication. Patient admitted to the floor postoperatively, monitored as per protocol. Diet sequentially advanced beginning POD 1, pain medications transitioned to oral during the hospital course. At the time of discharge, the patient is afebrile, vital signs stable, tolerating a clear liquid diet with protein supplementation, voiding spontaneously, ambulatory with adequate pain control with oral medications and clear surgical sites without evidence of infection.     Discharge Diet:  Clear Liquid Bariatric Diet for 7 days, then soft moist protein diet for 5 weeks    Discharge Medications:   *All medications as per Medical Reconciliation Form\"    Flintstones Complete Chewable vitamins, 2 orally daily for life  Calcium Citrate 2000mg orally daily for life  Vitamin B12 1000micrograms sublingual daily for life  Oxycodone 5mg tab 1-2 by mouth every 4-6 hours as needed for pain uncontrolled with Tylenol  Enoxaparin (Lovenox) 40mg sub-Q daily for 7 days    Discharge disposition: home      Local wound care with daily showers, keep wounds clean and dry    Activity: as desired, no lifting greater than 15lbs or situps for 30 days    Special Instructions:   No driving until activity is not influenced by incisional pain and off narcotics   No bath or hot tub until wounds are healed   Pulse and temperature twice daily for 10 days   Notify [employer] for a Temp >100.5 or Pulse>115    Followup with surgeon in 2 weeks

## 2017-09-01 ENCOUNTER — DOCUMENTATION ONLY (OUTPATIENT)
Dept: BARIATRICS/WEIGHT MGMT | Age: 37
End: 2017-09-01

## 2017-09-01 ENCOUNTER — HOSPITAL ENCOUNTER (OUTPATIENT)
Dept: BARIATRICS/WEIGHT MGMT | Age: 37
Discharge: HOME | End: 2017-09-01

## 2017-09-01 NOTE — PROGRESS NOTES
[facility]  POST-OP NUTRITION FOLLOW UP    Patient's Name: Maine Powell  YOB: 1980  Surgery Date: 17    Procedure: Gastric Bypass    Height: 5 f 9   Pre-Op Weight: 330   Current Weight: 292  Weight Lost: 38  BMI:  43.2    Attendance of support group:   When:   Why not:     Complications  Readmittance: None  Reoperations: None  Complications: None  IV Fluids: None  ER Trips: None    Problem Areas:   Nausea: Occasionally   Vomiting: Yes, if she drinks her water too quickly. Dumping Syndrome: None  Inadequate Protein: Yes, struggling to get 1 in per day. Inadequate Fluids: None  Food Intolerance: None  Hunger: States she thinks it is more brain hunger than physical hunger  Constipation: None    Eating 3 Meals/Day: Yes  Portion Size at Meals: 2 ounces   Protein from Food:     Foods being consumed:  Breakfast: Time:9:00 am:  Eggs or egg white. May do some rotisserie chicken  Lunch: Time: 12:30 pm:   Chicken, beans, some yogurt. Poor po tolerance to tuna  Dinner:  Time: 5:00 pm:   Soft protein foods. In-between eating: May have some string cheese    Length of time for meals: 10 minutes  food/fluids: 30/30    Fluids: 64 oz/day Types of Fluids: Water    MVI: Flintstones  Number/Day: bid Taken Separately: yes    Calcium: liquid -- chewable  Calcium Dosing: tid  Taken Separately: yes    Vitamin B12:  gummies   Vitamin B12 Dosin mcg    Vitamin D:    Vitamin D dosin IU, ran out recently    Iron: ordering it today  Iron dosing:      Protein Supplement: Struggling to get one whole one in   Grams of Protein:    Mixed with:    Splitting Protein Drink in 1/2:    Timing of Protein Drinks:   Patient is taking  days a week. Exercise (FITT): Walking for 30 minutes a day. States that some days are better than others. Comments:    Overall, patient is doing well. Her portions are around 2 ounces. She is sticking to soft protein.   She is having trouble with the protein drinks. I have recommended her to break it up into 1/3 throughout the day. She is taking her vitamins, was struggling with calcium, but order chewable. I have encouraged her to switch to sublingual b12. Patient is going to get OTC iron today. Her food choices and portions seem to be in line with recommendations    Patient was educated on the importance of eating meat and vegetables only. I have talked with patient about the effects of carbohydrates, not only from a weight management perspective, but also what effects it could have on their blood sugar and what reactive hypoglycemia is.         Diet Follow Up:  9 month class scheduled for: April 27 at 8 am    Angie John RD    9/1/2017

## 2017-11-07 ENCOUNTER — TELEPHONE (OUTPATIENT)
Dept: SURGERY | Age: 37
End: 2017-11-07

## 2017-11-09 ENCOUNTER — TELEPHONE (OUTPATIENT)
Dept: SURGERY | Age: 37
End: 2017-11-09

## 2018-03-29 RX ORDER — LORATADINE 10 MG/1
TABLET ORAL
Qty: 30 TAB | Refills: 4 | Status: SHIPPED | OUTPATIENT
Start: 2018-03-29 | End: 2018-08-21

## 2018-06-01 ENCOUNTER — DOCUMENTATION ONLY (OUTPATIENT)
Dept: BARIATRICS/WEIGHT MGMT | Age: 38
End: 2018-06-01

## 2018-07-02 ENCOUNTER — DOCUMENTATION ONLY (OUTPATIENT)
Dept: SURGERY | Age: 38
End: 2018-07-02

## 2018-07-02 NOTE — PROGRESS NOTES
Per [facility] requirements;  E-mail and letter sent for follow up appointment. [facility] [place] [facility] [address]      Dear Patient,  Your health is our main concern. It is important for your health to have follow-up lab work and to see you surgeon at 3 months, 6 months and annually after your weight loss surgery. Additionally, the Department of bariatric Surgery at our hospital is a member of the [employer] [address] Surgical Quality Improvement Program ([facility] NSQIP). As a participant in this program, we gather information on the outcomes of our patients after surgery. Please call the office for a follow up appointment at [phone] with SAMUEL Young. If you have moved out of the area or have changed surgeons please call us and let us know the name of your doctor. Your health and feedback are important to us. We greatly appreciate your response.        Thank you,  [facility] [employer] [facility] [place] [facility] [address]

## 2018-08-21 ENCOUNTER — OFFICE VISIT (OUTPATIENT)
Dept: FAMILY MEDICINE CLINIC | Age: 38
End: 2018-08-21

## 2018-08-21 VITALS
TEMPERATURE: 98.2 F | HEART RATE: 88 BPM | SYSTOLIC BLOOD PRESSURE: 106 MMHG | RESPIRATION RATE: 16 BRPM | OXYGEN SATURATION: 98 % | WEIGHT: 202 LBS | BODY MASS INDEX: 29.92 KG/M2 | HEIGHT: 69 IN | DIASTOLIC BLOOD PRESSURE: 74 MMHG

## 2018-08-21 DIAGNOSIS — K90.9 INTESTINAL MALABSORPTION, UNSPECIFIED TYPE: ICD-10-CM

## 2018-08-21 DIAGNOSIS — Z98.84 S/P GASTRIC BYPASS: ICD-10-CM

## 2018-08-21 DIAGNOSIS — E78.9 BORDERLINE HIGH CHOLESTEROL: ICD-10-CM

## 2018-08-21 DIAGNOSIS — R79.89 LOW VITAMIN D LEVEL: ICD-10-CM

## 2018-08-21 DIAGNOSIS — R73.03 PREDIABETES: ICD-10-CM

## 2018-08-21 DIAGNOSIS — R61 EXCESSIVE SWEATING: ICD-10-CM

## 2018-08-21 DIAGNOSIS — Z01.419 WELL WOMAN EXAM WITH ROUTINE GYNECOLOGICAL EXAM: Primary | ICD-10-CM

## 2018-08-21 NOTE — MR AVS SNAPSHOT
[address] [facility] [address] [facility] 
[phone] Patient: Bert Delgado MRN: [id number] Visit Information Date & Time Provider Department Dept. Phone Encounter #  
 8/21/2018  9:45 AM Glory Cai, [address] [facility] [id number] Follow-up Instructions Return in about 1 year (around 8/21/2019), or if symptoms worsen or fail to improve. Upcoming Health Maintenance Date Due  
 PAP AKA CERVICAL CYTOLOGY 6/23/2019 DTaP/Tdap/Td series (2 - Td) 4/1/2023 Allergies as of 8/21/2018  Review Complete On: 8/21/2018 By: Glory Cai MD  
 No Known Allergies Current Immunizations  Never Reviewed Name Date Tdap 4/1/2013 Not reviewed this visit You Were Diagnosed With   
  
 Codes Comments Well woman exam with routine gynecological exam    -  Primary ICD-10-CM: R54.291 ICD-9-CM: V72.31 Prediabetes     ICD-10-CM: R73.03 
ICD-9-CM: 790.29 Low vitamin D level     ICD-10-CM: E55.9 ICD-9-CM: 268.9 Borderline high cholesterol     ICD-10-CM: E78.9 ICD-9-CM: 272.9 S/P gastric bypass     ICD-10-CM: H78.80 ICD-9-CM: V45.86 BMI 29.0-29.9,adult     ICD-10-CM: L97.80 ICD-9-CM: V85.25 Intestinal malabsorption, unspecified type     ICD-10-CM: K90.9 ICD-9-CM: 579.9 Excessive sweating     ICD-10-CM: R61 
ICD-9-CM: 780.8 Vitals BP Pulse Temp Resp Height(growth percentile) Weight(growth percentile) 106/74 (BP 1 Location: Left arm, BP Patient Position: Sitting) 88 98.2 °F (36.8 °C) (Oral) 16 5' 9\" (1.753 m) 202 lb (91.6 kg) LMP SpO2 BMI OB Status Smoking Status 08/17/2018 (Approximate) 98% 29.83 kg/m2 Having regular periods Former Smoker Vitals History BMI and BSA Data Body Mass Index Body Surface Area  
 29.83 kg/m 2 2.11 m 2 Preferred Pharmacy Pharmacy Name Phone [id number] Wilbur Swain, [address] [facility] [facility] [phone] Your Updated Medication List  
  
   
This list is accurate as of 8/21/18 10:04 AM.  Always use your most recent med list.  
  
  
  
  
 loratadine 10 mg tablet Commonly known as:  Rib [place] Natarajaneting Take 1 Tab by mouth daily. Follow-up Instructions Return in about 1 year (around 8/21/2019), or if symptoms worsen or fail to improve. To-Do List   
 08/21/2018 Lab:  CBC WITH AUTOMATED DIFF   
  
 08/21/2018 Lab:  FERRITIN   
  
 08/21/2018 Lab:  HEMOGLOBIN A1C W/O EAG   
  
 08/21/2018 Lab:  IRON PROFILE   
  
 08/21/2018 Lab:  LIPID PANEL   
  
 08/21/2018 Lab:  METABOLIC PANEL, COMPREHENSIVE   
  
 08/21/2018 Lab:  TSH 3RD GENERATION   
  
 08/21/2018 Lab:  VITAMIN B1, WHOLE BLOOD   
  
 08/21/2018 Lab:  VITAMIN B12 & FOLATE   
  
 08/21/2018 Lab:  VITAMIN D, 25 HYDROXY Patient Instructions Heart-Healthy Diet: Care Instructions Your Care Instructions A heart-healthy diet has lots of vegetables, fruits, nuts, beans, and whole grains, and is low in salt. It limits foods that are high in saturated fat, such as meats, cheeses, and fried foods. It may be hard to change your diet, but even small changes can lower your risk of heart attack and heart disease. Follow-up care is a key part of your treatment and safety. Be sure to make and go to all appointments, and call your doctor if you are having problems. It's also a good idea to know your test results and keep a list of the medicines you take. How can you care for yourself at home? Watch your portions · Learn what a serving is. A \"serving\" and a \"portion\" are not always the same thing. Make sure that you are not eating larger portions than are recommended. For example, a serving of pasta is ½ cup. A serving size of meat is 2 to 3 ounces.  A 3-ounce serving is about the size of a deck of cards. Measure serving sizes until you are good at [place]" them. Keep in mind that restaurants often serve portions that are 2 or 3 times the size of one serving. · To keep your energy level up and keep you from feeling hungry, eat often but in smaller portions. · Eat only the number of calories you need to stay at a healthy weight. If you need to lose weight, eat fewer calories than your body burns (through exercise and other physical activity). Eat more fruits and vegetables · Eat a variety of fruit and vegetables every day. Dark green, deep orange, red, or yellow fruits and vegetables are especially good for you. Examples include spinach, carrots, peaches, and berries. · Keep carrots, celery, and other veggies handy for snacks. Buy fruit that is in season and store it where you can see it so that you will be tempted to eat it. · Cook dishes that have a lot of veggies in them, such as stir-fries and soups. Limit saturated and trans fat · Read food labels, and try to avoid saturated and trans fats. They increase your risk of heart disease. Trans fat is found in many processed foods such as cookies and crackers. · Use olive or canola oil when you cook. Try cholesterol-lowering spreads, such as Benecol or Take Control. · Bake, broil, grill, or steam foods instead of frying them. · Choose lean meats instead of high-fat meats such as hot dogs and sausages. Cut off all visible fat when you prepare meat. · Eat fish, skinless poultry, and meat alternatives such as soy products instead of high-fat meats. Soy products, such as tofu, may be especially good for your heart. · Choose low-fat or fat-free milk and dairy products. Eat fish · Eat at least two servings of fish a week. Certain fish, such as salmon and tuna, contain omega-3 fatty acids, which may help reduce your risk of heart attack. Eat foods high in fiber · Eat a variety of grain products every day.  Include whole-grain foods that have lots of fiber and nutrients. Examples of whole-grain foods include oats, whole wheat bread, and brown rice. · Buy whole-grain breads and cereals, instead of white bread or pastries. Limit salt and sodium · Limit how much salt and sodium you eat to help lower your blood pressure. · Taste food before you salt it. Add only a little salt when you think you need it. With time, your taste buds will adjust to less salt. · Eat fewer snack items, fast foods, and other high-salt, processed foods. Check food labels for the amount of sodium in packaged foods. · Choose low-sodium versions of canned goods (such as soups, vegetables, and beans). Limit sugar · Limit drinks and foods with added sugar. These include candy, desserts, and soda pop. Limit alcohol · Limit alcohol to no more than 2 drinks a day for men and 1 drink a day for women. Too much alcohol can cause health problems. When should you call for help? Watch closely for changes in your health, and be sure to contact your doctor if: 
  · You would like help planning heart-healthy meals. Where can you learn more? Go to http://[employer]Delta Systemsadriane.info/. Enter V137 in the search box to learn more about \"Heart-Healthy Diet: Care Instructions. \" Current as of: December 6, 2017 Content Version: 11.7 © 2940-6548 [employer]. Care instructions adapted under license by [employer] (which disclaims liability or warranty for this information). If you have questions about a medical condition or this instruction, always ask your healthcare professional. [place] [address] any warranty or liability for your use of this information. Introducing \[facility] [place] [facility]\"" & HEALTH SERVICES! Dear Porfirio Estes: Thank you for requesting a [employer] account. Our records indicate that you already have an active [employer] account. You can access your account anytime at [email or web address] Did you know that you can access your hospital and ER discharge instructions at any time in [employer]? You can also review all of your test results from your hospital stay or ER visit. Additional Information If you have questions, please visit the Frequently Asked Questions section of the [employer] website at [email or web address] Remember, [employer] is NOT to be used for urgent needs. For medical emergencies, dial 911. Now available from your iPhone and Android! Please provide this summary of care documentation to your next provider. Your primary care clinician is listed as Twan Farmer. If you have any questions after today's visit, please call [phone].

## 2018-08-21 NOTE — PROGRESS NOTES
1. Have you been to the ER, urgent care clinic since your last visit? Hospitalized since your last visit? No    2. Have you seen or consulted any other health care providers outside of the [address] [place] [address] since your last visit? Include any pap smears or colon screening.  No

## 2018-08-21 NOTE — PATIENT INSTRUCTIONS

## 2018-08-21 NOTE — PROGRESS NOTES
Subjective:   43 y.o. female for Well Woman Check. Patient's last menstrual period was 08/17/2018 (approximate). Social History: single partner, contraception - condoms. Marked weight loss s/p gastric bypass last year, she is completely off her medication for HTN, off CPAP for BRIAN. She is using MVT patch daily. She reports excessive sweating, no other skin, hair or BM changes. Menses are regular. Past Medical History:   Diagnosis Date    ADD (attention deficit disorder) 14 y/o     d/hanny meds 23 y/o    Cyst of left breast 3/15     6 month  for ff-up 9/15    Endometriosis     HTN (hypertension) 3/4/2010    Irregular menses     Kidney stone 2007    Migraine 3/4/2010    prn excedrine migraine OTC, trigger heat/dehydration    Morbid obesity ([address]     BRIAN on CPAP     NOT USING CPAP    Pelvic cramping     Prediabetes      Past Surgical History:   Procedure Laterality Date    HX DILATION AND CURETTAGE      HX GI      GBP    HX LAP GASTRIC BYPASS  07/26/2017    HX PELVIC LAPAROSCOPY  7/2002    endometriosis, lysis of adhesions d and c     Family History   Problem Relation Age of Onset    Alcohol abuse Mother     Ovarian Cancer Mother 54    Headache Mother     Osteoporosis Mother     Headache Father     Hypertension Father     Diabetes Father     Osteoporosis Maternal Grandmother     Cancer Maternal Grandmother     Hypertension Maternal Grandfather     Headache Paternal Grandmother     Arthritis-osteo Paternal Grandmother     Diabetes Paternal Grandfather     Stroke Paternal Grandfather      Social History   Substance Use Topics    Smoking status: Former Smoker     Packs/day: 0.25     Quit date: 8/1/2002    Smokeless tobacco: Never Used      Comment: started 22y/o    Alcohol use No        ROS:  Feeling well. No dyspnea or chest pain on exertion. No abdominal pain, change in bowel habits, black or bloody stools. No urinary tract symptoms.  GYN ROS: normal menses, no abnormal bleeding, pelvic pain or discharge, no breast pain or new or enlarging lumps on self exam. No neurological complaints. Objective:     Visit Vitals    /74 (BP 1 Location: Left arm, BP Patient Position: Sitting)    Pulse 88    Temp 98.2 °F (36.8 °C) (Oral)    Resp 16    Ht 5' 9\" (1.753 m)    Wt 202 lb (91.6 kg)    LMP 08/17/2018 (Approximate)    SpO2 98%    BMI 29.83 kg/m2     The patient appears well, alert, oriented x 3, in no distress. ENT normal.  Neck supple. No adenopathy or thyromegaly. DAVE. Lungs are clear, good air entry, no wheezes, rhonchi or rales. S1 and S2 normal, no murmurs, regular rate and rhythm. Abdomen soft without tenderness, guarding, mass or organomegaly. Extremities show no edema, normal peripheral pulses. Neurological is normal, no focal findings. BREAST EXAM: breasts appear normal, no suspicious masses, no skin or nipple changes or axillary nodes    PELVIC EXAM: examination not indicated    Assessment/Plan:   Diagnoses and all orders for this visit:    1. Well woman exam with routine gynecological exam    well woman  Mammogram-plan at age 35  pap smear- update 2019  return annually or prn  reviewed diet, exercise and weight control. tdap 2013    2. Prediabetes  -     METABOLIC PANEL, COMPREHENSIVE; Future  -     HEMOGLOBIN A1C W/O EAG; Future    3. Low vitamin D level  -     VITAMIN D, 25 HYDROXY; Future    4. Borderline high cholesterol  -     LIPID PANEL; Future    5. S/P gastric bypass    6. BMI 29.0-29.9,adult    7. Intestinal malabsorption, unspecified type  -     IRON PROFILE; Future  -     FERRITIN; Future  -     VITAMIN B12 & FOLATE; Future  -     VITAMIN B1, WHOLE BLOOD; Future    8. Excessive sweating  -     METABOLIC PANEL, COMPREHENSIVE; Future  -     TSH 3RD GENERATION; Future  -     CBC WITH AUTOMATED DIFF; Future    Ff-up in 1 year or sooner prn    Patient/guardian understands plan of care. Patient has provided input and agrees with goals.  Future labs to be discussed on next visit.

## 2019-06-05 ENCOUNTER — HOSPITAL ENCOUNTER (EMERGENCY)
Age: 39
Discharge: HOME | End: 2019-06-05
Attending: EMERGENCY MEDICINE
Payer: COMMERCIAL

## 2019-06-05 ENCOUNTER — APPOINTMENT (OUTPATIENT)
Dept: GENERAL RADIOLOGY | Age: 39
End: 2019-06-05
Attending: EMERGENCY MEDICINE
Payer: COMMERCIAL

## 2019-06-05 VITALS
BODY MASS INDEX: 28.88 KG/M2 | HEIGHT: 69 IN | TEMPERATURE: 98.8 F | RESPIRATION RATE: 18 BRPM | WEIGHT: 195 LBS | SYSTOLIC BLOOD PRESSURE: 139 MMHG | OXYGEN SATURATION: 100 % | HEART RATE: 96 BPM | DIASTOLIC BLOOD PRESSURE: 92 MMHG

## 2019-06-05 DIAGNOSIS — M79.642 LEFT HAND PAIN: ICD-10-CM
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
## 2019-06-05 DIAGNOSIS — S69.92XA HAND INJURY, LEFT, INITIAL ENCOUNTER: ICD-10-CM

## 2019-06-05 PROCEDURE — 99283 EMERGENCY DEPT VISIT LOW MDM: CPT

## 2019-06-05 PROCEDURE — 73130 X-RAY EXAM OF HAND: CPT

## 2019-06-05 PROCEDURE — L4350 ANKLE CONTROL ORTHO PRE OTS: HCPCS

## 2019-06-05 RX ORDER — CYCLOBENZAPRINE HCL 10 MG
10 TABLET ORAL
Qty: 15 TAB | Refills: 0 | Status: SHIPPED | OUTPATIENT
Start: 2019-06-05 | End: 2019-08-12

## 2019-06-05 RX ORDER — TRAMADOL HYDROCHLORIDE 50 MG/1
50 TABLET ORAL
Qty: 12 TAB | Refills: 0 | Status: SHIPPED | OUTPATIENT
Start: 2019-06-05 | End: 2019-06-08
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
## 2019-06-05 NOTE — ED NOTES
Kellie Brennan is a 35 y.o. female that was discharged in stable condition. The patients diagnosis, condition and treatment were explained to  patient and aftercare instructions were given. The patient verbalized understanding. Patient armband removed and shredded.
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
## 2019-06-11 ENCOUNTER — OFFICE VISIT (OUTPATIENT)
Dept: ORTHOPEDIC SURGERY | Age: 39
End: 2019-06-11

## 2019-06-11 ENCOUNTER — TELEPHONE (OUTPATIENT)
Dept: ORTHOPEDIC SURGERY | Age: 39
End: 2019-06-11

## 2019-06-11 VITALS
DIASTOLIC BLOOD PRESSURE: 83 MMHG | BODY MASS INDEX: 28.88 KG/M2 | WEIGHT: 195 LBS | SYSTOLIC BLOOD PRESSURE: 125 MMHG | HEIGHT: 69 IN | OXYGEN SATURATION: 97 %

## 2019-06-11 DIAGNOSIS — S60.222A CONTUSION OF LEFT HAND, INITIAL ENCOUNTER: ICD-10-CM

## 2019-06-11 DIAGNOSIS — M77.8 TENDINITIS OF LEFT HAND: ICD-10-CM

## 2019-06-11 DIAGNOSIS — M77.8 TENDINITIS OF LEFT HAND: Primary | ICD-10-CM

## 2019-06-11 RX ORDER — DICLOFENAC SODIUM 20 MG/G
2 SOLUTION TOPICAL
Qty: 112 G | Refills: 3 | Status: SHIPPED | OUTPATIENT
Start: 2019-06-11 | End: 2019-06-11

## 2019-06-11 RX ORDER — DICLOFENAC SODIUM 20 MG/G
2 SOLUTION TOPICAL
Qty: 112 G | Refills: 3 | Status: SHIPPED | OUTPATIENT
Start: 2019-06-11 | End: 2020-06-02
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
## 2019-06-11 NOTE — TELEPHONE ENCOUNTER
Please use Key: [id number] to initiate a prior authorization for Pennsaid through Cover My Meds or contact the pharmacy to change the medication.

## 2019-08-06 ENCOUNTER — OFFICE VISIT (OUTPATIENT)
Dept: ORTHOPEDIC SURGERY | Age: 39
End: 2019-08-06

## 2019-08-06 VITALS
BODY MASS INDEX: 28.88 KG/M2 | HEIGHT: 69 IN | WEIGHT: 195 LBS | DIASTOLIC BLOOD PRESSURE: 82 MMHG | RESPIRATION RATE: 15 BRPM | TEMPERATURE: 97.3 F | SYSTOLIC BLOOD PRESSURE: 132 MMHG | HEART RATE: 83 BPM

## 2019-08-06 DIAGNOSIS — M77.8 TENDINITIS OF LEFT HAND: Primary | ICD-10-CM

## 2019-08-06 DIAGNOSIS — S60.00XD CONTUSION OF FINGER OF LEFT HAND, SUBSEQUENT ENCOUNTER: ICD-10-CM

## 2019-08-06 NOTE — PROGRESS NOTES
HISTORY OF PRESENT ILLNESS    Keesha Leung is a 35y.o. year old female comes in today to be evaluated and treated for: left hand pain    Since last appt has noticed improvement in pain but some weakness with lifting things. Pain level 0 - No pain/10. Using Pennsiad with benefit. IMAGING: XR left hand 19  IMPRESSION:  Negative study. Past Surgical History:   Procedure Laterality Date    HX DILATION AND CURETTAGE      HX GI      GBP    HX LAP GASTRIC BYPASS  2017    HX PELVIC LAPAROSCOPY  2002    endometriosis, lysis of adhesions d and c     Social History     Socioeconomic History    Marital status:      Spouse name: Not on file    Number of children: Not on file    Years of education: Not on file    Highest education level: Not on file   Tobacco Use    Smoking status: Former Smoker     Packs/day: 0.25     Last attempt to quit: 2002     Years since quittin.0    Smokeless tobacco: Never Used    Tobacco comment: started 20y/o   Substance and Sexual Activity    Alcohol use: No    Drug use: No    Sexual activity: Yes     Partners: Male     Birth control/protection: Condom     Current Outpatient Medications   Medication Sig Dispense Refill    PENNSAID 20 mg/gram /actuation(2 %) sopm 2 Pump(s) by Apply Externally route two (2) times daily as needed (left hand). Cell: [phone]  Pharmacy: [phone] 112 g 3    cyclobenzaprine (FLEXERIL) 10 mg tablet Take 1 Tab by mouth three (3) times daily as needed for Muscle Spasm(s). 15 Tab 0    loratadine (CLARITIN) 10 mg tablet Take 1 Tab by mouth daily.  27 Tab 11     Past Medical History:   Diagnosis Date    ADD (attention deficit disorder) 14 y/o     d/hanny meds 23 y/o    Cyst of left breast 3/15     6 month  for ff-up 9/15    Endometriosis     HTN (hypertension) 3/4/2010    Irregular menses     Kidney stone     Migraine 3/4/2010    prn excedrine migraine OTC, trigger heat/dehydration    Morbid obesity ([address]     BRIAN on CPAP     NOT USING CPAP    Pelvic cramping     Prediabetes      Family History   Problem Relation Age of Onset    Alcohol abuse Mother     Ovarian Cancer Mother 54    Headache Mother     Osteoporosis Mother     Headache Father     Hypertension Father     Diabetes Father     Osteoporosis Maternal Grandmother     Cancer Maternal Grandmother     Hypertension Maternal Grandfather     Headache Paternal Grandmother     Arthritis-osteo Paternal Grandmother     Diabetes Paternal Grandfather     Stroke Paternal Grandfather          ROS:  No swell, numb    Objective:  /82   Pulse 83   Temp 97.3 °F (36.3 °C)   Resp 15   Ht 5' 9\" (1.753 m)   Wt 195 lb (88.5 kg)   BMI 28.80 kg/m²   GEN:  Appears stated age in NAD. HEAD:  Normocephalic, Atraumatic. NEURO:  Sensation intact light touch upper and lower extremities. Biceps & Triceps reflexes +2/4 bilaterally. right hand dominant. M/S:  left elbow/wrist: Negative TTP dorsal fingr extensors. No flora tenderness trapezoid. Phalen's negative. Tinel's negative. Strength +5/5 bilateral .  Piano key sign Positive bilateral .  Carpal bone motion abnorma. Finklestein's negative  TFCC Load Test negative. BILATERAL neither epicondyle(s) without TTP not changed with wrist extension. negative muscular atrophy. EXT:  no clubbing/cyanosis. no edema. Assessment/Plan:     ICD-10-CM ICD-9-CM    1. Tendinitis of left hand M77.9 727.05    2. Contusion of finger of left hand, subsequent encounter S60.00XD V58.89        Patient (or guardian if minor) verbalizes understanding of evaluation and plan. Will work o HEP for strengthening to rehab and RTC as needed.

## 2019-08-06 NOTE — PROGRESS NOTES
AVS reviewed: YES  HEP: AT reviewed  Resources Provided: YES Printed Photos  Patient questions/concerns answered: NO. Pt denied questions/concerns  Patient verbalized understanding of treatment plan: YES

## 2019-08-08 ENCOUNTER — DOCUMENTATION ONLY (OUTPATIENT)
Dept: ORTHOPEDIC SURGERY | Age: 39
End: 2019-08-08

## 2019-08-08 NOTE — PROGRESS NOTES
Records Request from [address] [place] [address] John Thomas was fulfilled and faxed back to sender 8-8-19.

## 2019-08-12 ENCOUNTER — OFFICE VISIT (OUTPATIENT)
Dept: FAMILY MEDICINE CLINIC | Age: 39
End: 2019-08-12

## 2019-08-12 VITALS
OXYGEN SATURATION: 99 % | HEIGHT: 69 IN | TEMPERATURE: 98 F | WEIGHT: 207.8 LBS | SYSTOLIC BLOOD PRESSURE: 110 MMHG | DIASTOLIC BLOOD PRESSURE: 70 MMHG | BODY MASS INDEX: 30.78 KG/M2 | HEART RATE: 85 BPM | RESPIRATION RATE: 16 BRPM

## 2019-08-12 DIAGNOSIS — Z12.31 SCREENING MAMMOGRAM, ENCOUNTER FOR: ICD-10-CM

## 2019-08-12 DIAGNOSIS — E55.9 HYPOVITAMINOSIS D: ICD-10-CM

## 2019-08-12 DIAGNOSIS — R73.03 PREDIABETES: ICD-10-CM

## 2019-08-12 DIAGNOSIS — K90.9 INTESTINAL MALABSORPTION, UNSPECIFIED TYPE: ICD-10-CM

## 2019-08-12 DIAGNOSIS — Z86.79 HISTORY OF HYPERTENSION: ICD-10-CM

## 2019-08-12 DIAGNOSIS — E78.9 BORDERLINE HIGH CHOLESTEROL: ICD-10-CM

## 2019-08-12 DIAGNOSIS — Z00.00 WELL ADULT EXAM: Primary | ICD-10-CM

## 2019-08-12 DIAGNOSIS — N60.02 CYST OF LEFT BREAST: ICD-10-CM

## 2019-08-12 RX ORDER — SCOLOPAMINE TRANSDERMAL SYSTEM 1 MG/1
1 PATCH, EXTENDED RELEASE TRANSDERMAL
Qty: 12 PATCH | Refills: 0 | Status: SHIPPED | OUTPATIENT
Start: 2019-08-12 | End: 2020-06-02

## 2019-08-12 RX ORDER — BISMUTH SUBSALICYLATE 262 MG
1 TABLET,CHEWABLE ORAL DAILY
COMMUNITY

## 2019-08-12 NOTE — PROGRESS NOTES
Subjective:   35 y.o. female for Well Woman Check. Her gyne and breast care is done elsewhere by her Ob-Gyne physician. Dr. Yumiko Andrews    She reports to be UTD with pap smear, however due for mammogram.  H/o prob benign breast nodule, L , says short interval ff-up not done due to cost. She denies any symptoms    H/o HTN/preDM/HL- she has maintained weight off after gastric bypass. She says using MVT patches regularly  Requesting complete physical form filled    Requesting motion sickness medication for upcoming cruise trip. Past Medical History:   Diagnosis Date    ADD (attention deficit disorder) 14 y/o     d/hanny meds 23 y/o    Cyst of left breast 3/15     6 month US for ff-up 9/15    Endometriosis     HTN (hypertension) 3/4/2010    Irregular menses     Kidney stone     Migraine 3/4/2010    prn excedrine migraine OTC, trigger heat/dehydration    Morbid obesity ([address]     BRIAN on CPAP     NOT USING CPAP    Pelvic cramping     Prediabetes      Past Surgical History:   Procedure Laterality Date    HX DILATION AND CURETTAGE      HX GI      GBP    HX LAP GASTRIC BYPASS  2017    HX PELVIC LAPAROSCOPY  2002    endometriosis, lysis of adhesions d and c     Family History   Problem Relation Age of Onset    Alcohol abuse Mother     Ovarian Cancer Mother 54    Headache Mother     Osteoporosis Mother     Headache Father     Hypertension Father     Diabetes Father     Osteoporosis Maternal Grandmother     Cancer Maternal Grandmother     Hypertension Maternal Grandfather     Headache Paternal Grandmother     Arthritis-osteo Paternal Grandmother     Diabetes Paternal Grandfather     Stroke Paternal Grandfather      Social History     Tobacco Use    Smoking status: Former Smoker     Packs/day: 0.25     Last attempt to quit: 2002     Years since quittin.0    Smokeless tobacco: Never Used    Tobacco comment: started 20y/o   Substance Use Topics    Alcohol use:  Yes Frequency: Monthly or less     Drinks per session: 1 or 2     Binge frequency: Never        ROS: Feeling generally well. No TIA's or unusual headaches, no dysphagia. No prolonged cough. No dyspnea or chest pain on exertion. No abdominal pain, change in bowel habits, black or bloody stools. No urinary tract symptoms. No new or unusual musculoskeletal symptoms. Objective: The patient appears well, alert, oriented x 3, in no distress. Visit Vitals  /70 (BP 1 Location: Left arm, BP Patient Position: Sitting)   Pulse 85   Temp 98 °F (36.7 °C) (Oral)   Resp 16   Ht 5' 9\" (1.753 m)   Wt 207 lb 12.8 oz (94.3 kg)   LMP 08/04/2019   SpO2 99%   BMI 30.69 kg/m²     ENT normal.  Neck supple. No adenopathy or thyromegaly. DAVE. Lungs are clear, good air entry, no wheezes, rhonchi or rales. S1 and S2 normal, no murmurs, regular rate and rhythm. Abdomen soft without tenderness, guarding, mass or organomegaly. Extremities show no edema, normal peripheral pulses. Neurological is normal, no focal findings. Breasts: breasts appear normal, no suspicious masses, no skin or nipple changes or axillary nodes. Pelvic exam deferred    Assessment/Plan:   Diagnoses and all orders for this visit:    1. Well adult exam  Well Woman  routine labs ordered, call if any problems  reviewed diet, exercise and weight control  tdap 2013  To complete employee health form once completed    2. Hypovitaminosis D  H/o recheck  -     VITAMIN D, 25 HYDROXY; Future  -     CBC WITH AUTOMATED DIFF; Future    3. Borderline high cholesterol  H/o recheck  -     LIPID PANEL; Future  -     CBC WITH AUTOMATED DIFF; Future    4. Prediabetes  H/o recheck  -     METABOLIC PANEL, COMPREHENSIVE; Future  -     HEMOGLOBIN A1C W/O EAG; Future  -     CBC WITH AUTOMATED DIFF; Future    5. History of hypertension  -     CBC WITH AUTOMATED DIFF; Future    6.  Intestinal malabsorption, unspecified type  -     VITAMIN D, 25 HYDROXY; Future  -     VITAMIN B12 & FOLATE; Future  -     VITAMIN B1, WHOLE BLOOD; Future  -     IRON PROFILE; Future  -     FERRITIN; Future  -     CBC WITH AUTOMATED DIFF; Future    7. Screening mammogram, encounter for  -     JANIYA MAMMO BI DX INCL CAD; Future    8. Cyst of left breast  -     JANIYA MAMMO BI DX INCL CAD; Future  -     US BREAST LT LIMITED=<3 QUAD; Future    Other orders  -     scopolamine (TRANSDERM-SCOP) 1 mg over 3 days pt3d; 1 Patch by TransDERmal route every seventy-two (72) hours. Ff-up in 1 year or sooner prn    Patient/guardian understands plan of care. Patient has provided input and agrees with goals. Future labs to be discussed on next visit.

## 2019-08-12 NOTE — PROGRESS NOTES
1. Have you been to the ER, urgent care clinic since your last visit? Hospitalized since your last visit? [facility] 6/05/19    2. Have you seen or consulted any other health care providers outside of the [address] [place] [address] since your last visit? Include any pap smears or colon screening. No     Last pap 12/2017 Dr. Jadiel De Luna. Abnormal Pap smears No.  Procedures if indicated No.Form of contraception Condoms. Mammogram (if indicated) 3/24/15. Family history of breast CA No, colon CA No, cervical CA No.Tetanus 4/01/13. Mother and MGM had ovarian cancer.     Chief Complaint   Patient presents with    Well Woman

## 2019-08-12 NOTE — PATIENT INSTRUCTIONS
A Healthy Heart: Care Instructions Your Care Instructions Heart disease occurs when a substance called plaque builds up in the vessels that supply oxygen-rich blood to your heart. This can narrow the blood vessels and reduce blood flow. A heart attack happens when blood flow is completely blocked. A high-fat diet, smoking, and other factors increase the risk of heart disease. Your doctor has found that you have a chance of having heart disease. You can do lots of things to keep your heart healthy. It may not be easy, but you can change your diet, exercise more, and quit smoking. These steps really work to lower your chance of heart disease. Follow-up care is a key part of your treatment and safety. Be sure to make and go to all appointments, and call your doctor if you are having problems. It's also a good idea to know your test results and keep a list of the medicines you take. How can you care for yourself at home? Diet 
  · Use less salt when you cook and eat. This helps lower your blood pressure. Taste food before salting. Add only a little salt when you think you need it. With time, your taste buds will adjust to less salt.  
  · Eat fewer snack items, fast foods, canned soups, and other high-salt, high-fat, processed foods.  
  · Read food labels and try to avoid saturated and trans fats. They increase your risk of heart disease by raising cholesterol levels.  
  · Limit the amount of solid fat-butter, margarine, and shortening-you eat. Use olive, peanut, or canola oil when you cook. Bake, broil, and steam foods instead of frying them.  
  · Eating fish can lower your risk for heart disease. Eat at least 2 servings of fish a week. [place], mackerel, herring, sardines, and chunk light tuna are very good choices. These fish contain omega-3 fatty acids.  
  · Eat a variety of fruit and vegetables every day. Dark green, deep orange, red, or yellow fruits and vegetables are especially good for you. Examples include spinach, carrots, peaches, and berries.  
  · Foods high in fiber can reduce your cholesterol and provide important vitamins and minerals. High-fiber foods include whole-grain cereals and breads, oatmeal, beans, brown rice, citrus fruits, and apples.  
  · Limit drinks and foods with added sugar. These include candy, desserts, and soda pop.  
 Lifestyle changes 
  · If your doctor recommends it, get more exercise. Walking is a good choice. Bit by bit, increase the amount you walk every day. Try for at least 30 minutes on most days of the week. You also may want to swim, bike, or do other activities.  
  · Do not smoke. If you need help quitting, talk to your doctor about stop-smoking programs and medicines. These can increase your chances of quitting for good. Quitting smoking may be the most important step you can take to protect your heart. It is never too late to quit. You will get health benefits right away.  
  · Limit alcohol to 2 drinks a day for men and 1 drink a day for women. Too much alcohol can cause health problems. Medicines 
  · Take your medicines exactly as prescribed. Call your doctor if you think you are having a problem with your medicine.  
  · If your doctor recommends aspirin, take the amount directed each day. Make sure you take aspirin and not another kind of pain reliever, such as acetaminophen (Tylenol). If you take ibuprofen (such as Advil or Motrin) for other problems, take aspirin at least 2 hours before taking ibuprofen. When should you call for help? Call 911 if you have symptoms of a heart attack. These may include: 
  · Chest pain or pressure, or a strange feeling in the chest.  
  · Sweating.  
  · Shortness of breath.  
  · Pain, pressure, or a strange feeling in the back, neck, jaw, or upper belly or in one or both shoulders or arms.  
  · Lightheadedness or sudden weakness.  
  · A fast or irregular heartbeat.  After you call 911, the  may tell you to chew 1 adult-strength or 2 to 4 low-dose aspirin. Wait for an ambulance. Do not try to drive yourself. 
 Watch closely for changes in your health, and be sure to contact your doctor if you have any problems. Where can you learn more? Go to http://alex-adriane.info/. Enter G164 in the search box to learn more about \"A Healthy Heart: Care Instructions. \" Current as of: July 22, 2018 Content Version: 12.1 © 4506-2776 [employer]wise, Incorporated. Care instructions adapted under license by [employer] (which disclaims liability or warranty for this information). If you have questions about a medical condition or this instruction, always ask your healthcare professional. [address] any warranty or liability for your use of this information.

## 2019-08-12 NOTE — LETTER
8/12/2019 3:30 PM 
 
Ms. Liz Masters [address] [place] [id number] To Whom It May Concern: 
 
Liz Masters is currently under the care of [facility]. She  is in satisfactory health with no concerns with driving. If there are questions or concerns please have the patient contact our office.  
 
 
 
Sincerely, 
 
 
Eliseo Hernandez MD

## 2019-08-13 ENCOUNTER — HOSPITAL ENCOUNTER (OUTPATIENT)
Dept: LAB | Age: 39
Discharge: HOME | End: 2019-08-13
Payer: COMMERCIAL

## 2019-08-13 DIAGNOSIS — K90.9 INTESTINAL MALABSORPTION, UNSPECIFIED TYPE: ICD-10-CM

## 2019-08-13 DIAGNOSIS — E55.9 HYPOVITAMINOSIS D: ICD-10-CM

## 2019-08-13 DIAGNOSIS — R73.03 PREDIABETES: ICD-10-CM

## 2019-08-13 DIAGNOSIS — Z86.79 HISTORY OF HYPERTENSION: ICD-10-CM

## 2019-08-13 DIAGNOSIS — E78.9 BORDERLINE HIGH CHOLESTEROL: ICD-10-CM

## 2019-08-13 LAB
25(OH)D3 SERPL-MCNC: 19.8 NG/ML (ref 30–100)
ALBUMIN SERPL-MCNC: 3.5 G/DL (ref 3.4–5)
ALBUMIN/GLOB SERPL: 1.2 {RATIO} (ref 0.8–1.7)
ALP SERPL-CCNC: 79 U/L (ref 45–117)
ALT SERPL-CCNC: 19 U/L (ref 13–56)
ANION GAP SERPL CALC-SCNC: 4 MMOL/L (ref 3–18)
AST SERPL-CCNC: 11 U/L (ref 10–38)
BASOPHILS # BLD: 0 K/UL (ref 0–0.1)
BASOPHILS NFR BLD: 0 % (ref 0–2)
BILIRUB SERPL-MCNC: 0.9 MG/DL (ref 0.2–1)
BUN SERPL-MCNC: 12 MG/DL (ref 7–18)
BUN/CREAT SERPL: 22 (ref 12–20)
CALCIUM SERPL-MCNC: 8.4 MG/DL (ref 8.5–10.1)
CHLORIDE SERPL-SCNC: 106 MMOL/L (ref 100–111)
CHOLEST SERPL-MCNC: 189 MG/DL
CO2 SERPL-SCNC: 31 MMOL/L (ref 21–32)
CREAT SERPL-MCNC: 0.54 MG/DL (ref 0.6–1.3)
DIFFERENTIAL METHOD BLD: ABNORMAL
EOSINOPHIL # BLD: 0.1 K/UL (ref 0–0.4)
EOSINOPHIL NFR BLD: 1 % (ref 0–5)
ERYTHROCYTE [DISTWIDTH] IN BLOOD BY AUTOMATED COUNT: 12.9 % (ref 11.6–14.5)
FERRITIN SERPL-MCNC: 4 NG/ML (ref 8–388)
FOLATE SERPL-MCNC: >20 NG/ML (ref 3.1–17.5)
GLOBULIN SER CALC-MCNC: 2.9 G/DL (ref 2–4)
GLUCOSE SERPL-MCNC: 93 MG/DL (ref 74–99)
HBA1C MFR BLD: 5.4 % (ref 4.2–5.6)
HCT VFR BLD AUTO: 33.7 % (ref 35–45)
HDLC SERPL-MCNC: 72 MG/DL (ref 40–60)
HDLC SERPL: 2.6 {RATIO} (ref 0–5)
HGB BLD-MCNC: 10.7 G/DL (ref 12–16)
IRON SATN MFR SERPL: 16 %
IRON SERPL-MCNC: 73 UG/DL (ref 50–175)
LDLC SERPL CALC-MCNC: 100.6 MG/DL (ref 0–100)
LIPID PROFILE,FLP: ABNORMAL
LYMPHOCYTES # BLD: 1.9 K/UL (ref 0.9–3.6)
LYMPHOCYTES NFR BLD: 29 % (ref 21–52)
MCH RBC QN AUTO: 26.8 PG (ref 24–34)
MCHC RBC AUTO-ENTMCNC: 31.8 G/DL (ref 31–37)
MCV RBC AUTO: 84.5 FL (ref 74–97)
MONOCYTES # BLD: 0.4 K/UL (ref 0.05–1.2)
MONOCYTES NFR BLD: 6 % (ref 3–10)
NEUTS SEG # BLD: 4.1 K/UL (ref 1.8–8)
NEUTS SEG NFR BLD: 64 % (ref 40–73)
PLATELET # BLD AUTO: 370 K/UL (ref 135–420)
PMV BLD AUTO: 9.7 FL (ref 9.2–11.8)
POTASSIUM SERPL-SCNC: 4.5 MMOL/L (ref 3.5–5.5)
PROT SERPL-MCNC: 6.4 G/DL (ref 6.4–8.2)
RBC # BLD AUTO: 3.99 M/UL (ref 4.2–5.3)
SODIUM SERPL-SCNC: 141 MMOL/L (ref 136–145)
TIBC SERPL-MCNC: 453 UG/DL (ref 250–450)
TRIGL SERPL-MCNC: 82 MG/DL (ref ?–150)
VIT B12 SERPL-MCNC: 1136 PG/ML (ref 211–911)
VLDLC SERPL CALC-MCNC: 16.4 MG/DL
WBC # BLD AUTO: 6.5 K/UL (ref 4.6–13.2)

## 2019-08-13 PROCEDURE — 36415 COLL VENOUS BLD VENIPUNCTURE: CPT

## 2019-08-13 PROCEDURE — 82728 ASSAY OF FERRITIN: CPT

## 2019-08-13 PROCEDURE — 80053 COMPREHEN METABOLIC PANEL: CPT

## 2019-08-13 PROCEDURE — 85025 COMPLETE CBC W/AUTO DIFF WBC: CPT

## 2019-08-13 PROCEDURE — 84425 ASSAY OF VITAMIN B-1: CPT

## 2019-08-13 PROCEDURE — 83036 HEMOGLOBIN GLYCOSYLATED A1C: CPT

## 2019-08-13 PROCEDURE — 82306 VITAMIN D 25 HYDROXY: CPT

## 2019-08-13 PROCEDURE — 80061 LIPID PANEL: CPT

## 2019-08-13 PROCEDURE — 82607 VITAMIN B-12: CPT

## 2019-08-13 PROCEDURE — 83540 ASSAY OF IRON: CPT

## 2019-08-15 LAB — VIT B1 BLD-SCNC: 126.6 NMOL/L (ref 66.5–200)

## 2019-08-16 DIAGNOSIS — E55.9 HYPOVITAMINOSIS D: Primary | ICD-10-CM

## 2019-08-16 DIAGNOSIS — D50.8 IRON DEFICIENCY ANEMIA SECONDARY TO INADEQUATE DIETARY IRON INTAKE: ICD-10-CM

## 2019-08-16 RX ORDER — ERGOCALCIFEROL 1.25 MG/1
50000 CAPSULE ORAL
Qty: 12 CAP | Refills: 0 | Status: SHIPPED | OUTPATIENT
Start: 2019-08-16 | End: 2019-11-09 | Stop reason: SDUPTHER

## 2019-08-16 RX ORDER — FERROUS SULFATE 325(65) MG
325 TABLET, DELAYED RELEASE (ENTERIC COATED) ORAL
Qty: 90 TAB | Refills: 0 | Status: SHIPPED | OUTPATIENT
Start: 2019-08-16 | End: 2020-07-09

## 2019-08-16 NOTE — PROGRESS NOTES
+ iron deficiency anemia, advise extra po feso4 325 mg OD  + vitamin D def, advise extra ergocalciferol 50k weekly x 3 months  Will recheck these levels again in 3 months and advised ff-up then, orders placed for replacement and labs  pls schedule appt  Around November  Cholesterol/glucose levels look good  pls complete her form, thanks

## 2019-08-19 ENCOUNTER — TELEPHONE (OUTPATIENT)
Dept: FAMILY MEDICINE CLINIC | Age: 39
End: 2019-08-19

## 2019-08-19 NOTE — PATIENT INSTRUCTIONS
Get a rubber band and loop around fingers and thumb. Open 10 times each hand and do exercises 3 sets a day. Squeeze tennis ball or rolled up socks 10 times a day for 3 sets each. Left message, need to review insurance requirements for bariatric program.

## 2019-08-28 NOTE — PROGRESS NOTES
Patient identified with 2 identifiers (name and ).   Patient aware of + iron deficiency anemia, advise extra po feso4 325 mg OD   + vitamin D def, advise extra ergocalciferol 50k weekly x 3 months   Will recheck these levels again in 3 months and advised ff-up then, orders placed for replacement and labs     Cholesterol/glucose levels look good appt has been schedule 2019 at 10:45 am. Lab order placed in mail

## 2019-08-28 NOTE — TELEPHONE ENCOUNTER
Patient identified with 2 identifiers (name and ). Patient aware employee form has been faxed. Patient also aware of lab results.

## 2019-09-24 ENCOUNTER — HOSPITAL ENCOUNTER (OUTPATIENT)
Dept: MAMMOGRAPHY | Age: 39
Discharge: HOME | End: 2019-09-24
Attending: FAMILY MEDICINE
Payer: COMMERCIAL

## 2019-09-24 ENCOUNTER — HOSPITAL ENCOUNTER (OUTPATIENT)
Dept: ULTRASOUND IMAGING | Age: 39
Discharge: HOME | End: 2019-09-24
Attending: FAMILY MEDICINE
Payer: COMMERCIAL

## 2019-09-24 DIAGNOSIS — Z12.31 SCREENING MAMMOGRAM, ENCOUNTER FOR: ICD-10-CM

## 2019-09-24 DIAGNOSIS — N60.02 CYST OF LEFT BREAST: ICD-10-CM

## 2019-09-24 DIAGNOSIS — R92.2 INCONCLUSIVE MAMMOGRAM: Primary | ICD-10-CM

## 2019-09-24 DIAGNOSIS — R92.2 INCONCLUSIVE MAMMOGRAM: ICD-10-CM

## 2019-09-24 PROCEDURE — 77062 BREAST TOMOSYNTHESIS BI: CPT

## 2019-09-24 PROCEDURE — 77066 DX MAMMO INCL CAD BI: CPT

## 2019-09-24 PROCEDURE — 76642 ULTRASOUND BREAST LIMITED: CPT

## 2019-11-11 RX ORDER — ERGOCALCIFEROL 1.25 MG/1
CAPSULE ORAL
Qty: 12 CAP | Refills: 0 | Status: SHIPPED | OUTPATIENT
Start: 2019-11-11 | End: 2020-02-24

## 2019-11-19 ENCOUNTER — HOSPITAL ENCOUNTER (OUTPATIENT)
Dept: LAB | Age: 39
Discharge: HOME | End: 2019-11-19
Payer: COMMERCIAL

## 2019-11-19 DIAGNOSIS — D50.8 IRON DEFICIENCY ANEMIA SECONDARY TO INADEQUATE DIETARY IRON INTAKE: ICD-10-CM

## 2019-11-19 DIAGNOSIS — E55.9 HYPOVITAMINOSIS D: ICD-10-CM

## 2019-11-19 LAB
25(OH)D3 SERPL-MCNC: 32.6 NG/ML (ref 30–100)
BASOPHILS # BLD: 0.1 K/UL (ref 0–0.1)
BASOPHILS NFR BLD: 1 % (ref 0–2)
DIFFERENTIAL METHOD BLD: NORMAL
EOSINOPHIL # BLD: 0.1 K/UL (ref 0–0.4)
EOSINOPHIL NFR BLD: 1 % (ref 0–5)
ERYTHROCYTE [DISTWIDTH] IN BLOOD BY AUTOMATED COUNT: 14 % (ref 11.6–14.5)
FERRITIN SERPL-MCNC: 6 NG/ML (ref 8–388)
HCT VFR BLD AUTO: 38.2 % (ref 35–45)
HGB BLD-MCNC: 12.1 G/DL (ref 12–16)
IRON SATN MFR SERPL: 17 %
IRON SERPL-MCNC: 83 UG/DL (ref 50–175)
LYMPHOCYTES # BLD: 1.9 K/UL (ref 0.9–3.6)
LYMPHOCYTES NFR BLD: 24 % (ref 21–52)
MCH RBC QN AUTO: 27.9 PG (ref 24–34)
MCHC RBC AUTO-ENTMCNC: 31.7 G/DL (ref 31–37)
MCV RBC AUTO: 88.2 FL (ref 74–97)
MONOCYTES # BLD: 0.5 K/UL (ref 0.05–1.2)
MONOCYTES NFR BLD: 6 % (ref 3–10)
NEUTS SEG # BLD: 5.5 K/UL (ref 1.8–8)
NEUTS SEG NFR BLD: 68 % (ref 40–73)
PLATELET # BLD AUTO: 379 K/UL (ref 135–420)
PMV BLD AUTO: 10 FL (ref 9.2–11.8)
RBC # BLD AUTO: 4.33 M/UL (ref 4.2–5.3)
TIBC SERPL-MCNC: 480 UG/DL (ref 250–450)
WBC # BLD AUTO: 8 K/UL (ref 4.6–13.2)

## 2019-11-19 PROCEDURE — 82306 VITAMIN D 25 HYDROXY: CPT

## 2019-11-19 PROCEDURE — 85025 COMPLETE CBC W/AUTO DIFF WBC: CPT

## 2019-11-19 PROCEDURE — 83540 ASSAY OF IRON: CPT

## 2019-11-19 PROCEDURE — 36415 COLL VENOUS BLD VENIPUNCTURE: CPT

## 2019-11-19 PROCEDURE — 82728 ASSAY OF FERRITIN: CPT

## 2020-06-01 ENCOUNTER — E-VISIT (OUTPATIENT)
Dept: FAMILY MEDICINE CLINIC | Age: 40
End: 2020-06-01

## 2020-06-02 ENCOUNTER — VIRTUAL VISIT (OUTPATIENT)
Dept: FAMILY MEDICINE CLINIC | Age: 40
End: 2020-06-02

## 2020-06-02 DIAGNOSIS — R73.03 PREDIABETES: ICD-10-CM

## 2020-06-02 DIAGNOSIS — Z86.79 HISTORY OF HYPERTENSION: ICD-10-CM

## 2020-06-02 DIAGNOSIS — E78.9 BORDERLINE HIGH CHOLESTEROL: ICD-10-CM

## 2020-06-02 DIAGNOSIS — K90.9 INTESTINAL MALABSORPTION, UNSPECIFIED TYPE: ICD-10-CM

## 2020-06-02 DIAGNOSIS — E55.9 HYPOVITAMINOSIS D: ICD-10-CM

## 2020-06-02 DIAGNOSIS — R30.0 DYSURIA: Primary | ICD-10-CM

## 2020-06-02 RX ORDER — LORATADINE 10 MG/1
10 TABLET ORAL
COMMUNITY

## 2020-06-02 RX ORDER — SULFAMETHOXAZOLE AND TRIMETHOPRIM 800; 160 MG/1; MG/1
1 TABLET ORAL 2 TIMES DAILY
Qty: 6 TAB | Refills: 0 | Status: SHIPPED | OUTPATIENT
Start: 2020-06-02 | End: 2020-06-05

## 2020-06-02 NOTE — PROGRESS NOTES
Yasir Singh is a 38 y.o. female who was seen by synchronous (real-time) audio-video technology on 6/2/2020. Consent: Yasir Singh, who was seen by synchronous (real-time) audio-video technology, and/or her healthcare decision maker, is aware that this patient-initiated, Telehealth encounter on 6/2/2020 is a billable service, with coverage as determined by her insurance carrier. She is aware that she may receive a bill and has provided verbal consent to proceed: Yes. 712  Subjective:   Yasir Singh is a 38 y.o. female who was seen for No chief complaint on file. Dysuria  X 3 days    C/o urinary burning, freq, urgency, suprapubic pain. Denies fever, flank pain or recurrent UTI. H/o HTN/HL/preDM, improved after gastric bypass. Says weight about the same 207. She is staying active. Prior to Admission medications    Medication Sig Start Date End Date Taking? Authorizing Provider   loratadine (Claritin) 10 mg tablet Take 10 mg by mouth. Yes Provider, Historical   trimethoprim-sulfamethoxazole (Bactrim DS) 160-800 mg per tablet Take 1 Tab by mouth two (2) times a day for 3 days. 6/2/20 6/5/20 Yes Ginger Richardson MD   ergocalciferol (ERGOCALCIFEROL) 1,250 mcg (50,000 unit) capsule TAKE 1 CAPSULE BY MOUTH EVERY 7 DAYS 2/24/20  Yes Ginger Richardson MD   ferrous sulfate (IRON) 325 mg (65 mg iron) EC tablet Take 1 Tab by mouth Daily (before breakfast). 8/16/19  Yes Ginger Richardson MD   multivitamin (ONE A DAY) tablet Take 1 Tab by mouth daily. Yes Provider, Historical   scopolamine (TRANSDERM-SCOP) 1 mg over 3 days pt3d 1 Patch by TransDERmal route every seventy-two (72) hours. 8/12/19 6/2/20  Ginger Richardson MD   PENNSAID 20 mg/gram /actuation(2 %) sopm 2 Pump(s) by Apply Externally route two (2) times daily as needed (left hand).  Cell: [phone]  Pharmacy: [phone] 6/11/19 6/2/20  Willeen Olden, DO     No Known Allergies    Patient Active Problem List    Diagnosis Date Noted    Postoperative malabsorption 08/09/2017    Low vitamin D level 08/09/2017    Prediabetes 05/05/2017    BRIAN on CPAP 05/05/2017    BRIAN (obstructive sleep apnea) 05/04/2017    BMI 50.0-59.9, adult ([facility] 05/04/2017    Hypovitaminosis D 04/10/2017    AZAEL (stress urinary incontinence, female) 03/03/2017    Hypersomnolence 03/03/2017    Chronic fatigue 03/03/2017    Borderline high cholesterol 10/18/2016    Breast cyst 12/01/2015    Endometriosis 05/06/2014    Morbid obesity ([facility] 12/10/2013    Hypercholesteremia 04/13/2010    Migraine 03/04/2010    HTN (hypertension) 03/04/2010     Current Outpatient Medications   Medication Sig Dispense Refill    loratadine (Claritin) 10 mg tablet Take 10 mg by mouth.  trimethoprim-sulfamethoxazole (Bactrim DS) 160-800 mg per tablet Take 1 Tab by mouth two (2) times a day for 3 days. 6 Tab 0    ergocalciferol (ERGOCALCIFEROL) 1,250 mcg (50,000 unit) capsule TAKE 1 CAPSULE BY MOUTH EVERY 7 DAYS 12 Cap 3    ferrous sulfate (IRON) 325 mg (65 mg iron) EC tablet Take 1 Tab by mouth Daily (before breakfast). 90 Tab 0    multivitamin (ONE A DAY) tablet Take 1 Tab by mouth daily.        No Known Allergies  Past Medical History:   Diagnosis Date    ADD (attention deficit disorder) 14 y/o     d/hanny meds 23 y/o    Cyst of left breast 3/15     6 month  for ff-up 9/15    Endometriosis     HTN (hypertension) 3/4/2010    Irregular menses     Kidney stone 2007    Migraine 3/4/2010    prn excedrine migraine OTC, trigger heat/dehydration    Morbid obesity ([facility]     BRIAN on CPAP     NOT USING CPAP    Pelvic cramping     Prediabetes      Past Surgical History:   Procedure Laterality Date    HX DILATION AND CURETTAGE      HX GI      GBP    HX LAP GASTRIC BYPASS  07/26/2017    HX PELVIC LAPAROSCOPY  7/2002    endometriosis, lysis of adhesions d and c     Family History   Problem Relation Age of Onset    Alcohol abuse Mother     Ovarian Cancer Mother 54    Headache Mother     Osteoporosis Mother     Headache Father     Hypertension Father     Diabetes Father     Osteoporosis Maternal Grandmother     Cancer Maternal Grandmother     Hypertension Maternal Grandfather     Headache Paternal Grandmother     Arthritis-osteo Paternal Grandmother     Diabetes Paternal Grandfather     Stroke Paternal Grandfather      Social History     Tobacco Use    Smoking status: Former Smoker     Packs/day: 0.25     Last attempt to quit: 2002     Years since quittin.8    Smokeless tobacco: Never Used    Tobacco comment: started 20y/o   Substance Use Topics    Alcohol use: Yes     Frequency: Monthly or less     Drinks per session: 1 or 2     Binge frequency: Never       ROS      Objective: There were no vitals taken for this visit. General: alert, cooperative, no distress   Mental  status: normal mood, behavior, speech, dress, motor activity, and thought processes, able to follow commands   HENT: NCAT   Neck: no visualized mass   Resp: no respiratory distress   Neuro: no gross deficits   Skin: no discoloration or lesions of concern on visible areas   Psychiatric: normal affect, consistent with stated mood, no evidence of hallucinations     Additional exam findings: We discussed the expected course, resolution and complications of the diagnosis(es) in detail. Medication risks, benefits, costs, interactions, and alternatives were discussed as indicated. I advised her to contact the office if her condition worsens, changes or fails to improve as anticipated. She expressed understanding with the diagnosis(es) and plan. Naldo Bond is a 38 y.o. female who was evaluated by a video visit encounter for concerns as above. Patient identification was verified prior to start of the visit. A caregiver was present when appropriate.  Due to this being a TeleHealth encounter (During [facility] public health emergency), evaluation of the following organ systems was limited: Vitals/Constitutional/EENT/Resp/CV/GI//MS/Neuro/Skin/Heme-Lymph-Imm. Pursuant to the emergency declaration under the [facility] waiver authority and the [employer] Act, this Virtual  Visit was conducted, with patient's (and/or legal guardian's) consent, to reduce the patient's risk of exposure to COVID-19 and provide necessary medical care. Services were provided through a video synchronous discussion virtually to substitute for in-person clinic visit. Patient and provider were located at their individual homes. Assessment & Plan:   Diagnoses and all orders for this visit:    1. Dysuria  empiricially treat with bactrim for acute cystitis, Urine tests prior  -     URINALYSIS W/ RFLX MICROSCOPIC; Future  -     CULTURE, URINE; Future    2. History of hypertension  Monitoring    3. Hypovitaminosis D  On 50 k weekly replacement  -     VITAMIN D, 25 HYDROXY; Future    4. Prediabetes  H/o  -     METABOLIC PANEL, COMPREHENSIVE; Future  -     HEMOGLOBIN A1C W/O EAG; Future    5. Borderline high cholesterol  -     METABOLIC PANEL, COMPREHENSIVE; Future  -     LIPID PANEL; Future    6. Intestinal malabsorption, unspecified type  -     CBC WITH AUTOMATED DIFF; Future  -     IRON PROFILE; Future  -     FERRITIN; Future  -     VITAMIN B12 & FOLATE; Future  -     VITAMIN B1, WHOLE BLOOD; Future    Other orders  -     trimethoprim-sulfamethoxazole (Bactrim DS) 160-800 mg per tablet; Take 1 Tab by mouth two (2) times a day for 3 days.       Ff-up in 3 months or sooner prn, plan for CPE then     Dana Viveros MD

## 2020-06-02 NOTE — PATIENT INSTRUCTIONS
Urinary Tract Infection in Women: Care Instructions Your Care Instructions A urinary tract infection, or UTI, is a general term for an infection anywhere between the kidneys and the urethra (where urine comes out). Most UTIs are bladder infections. They often cause pain or burning when you urinate. UTIs are caused by bacteria and can be cured with antibiotics. Be sure to complete your treatment so that the infection goes away. Follow-up care is a key part of your treatment and safety. Be sure to make and go to all appointments, and call your doctor if you are having problems. It's also a good idea to know your test results and keep a list of the medicines you take. How can you care for yourself at home? · Take your antibiotics as directed. Do not stop taking them just because you feel better. You need to take the full course of antibiotics. · Drink extra water and other fluids for the next day or two. This may help wash out the bacteria that are causing the infection. (If you have kidney, heart, or liver disease and have to limit fluids, talk with your doctor before you increase your fluid intake.) · Avoid drinks that are carbonated or have caffeine. They can irritate the bladder. · Urinate often. Try to empty your bladder each time. · To relieve pain, take a hot bath or lay a heating pad set on low over your lower belly or genital area. Never go to sleep with a heating pad in place. To prevent UTIs · Drink plenty of water each day. This helps you urinate often, which clears bacteria from your system. (If you have kidney, heart, or liver disease and have to limit fluids, talk with your doctor before you increase your fluid intake.) · Urinate when you need to. · Urinate right after you have sex. · Change sanitary pads often. · Avoid douches, bubble baths, feminine hygiene sprays, and other feminine hygiene products that have deodorants. · After going to the bathroom, wipe from front to back. When should you call for help? Call your doctor now or seek immediate medical care if: · Symptoms such as fever, chills, nausea, or vomiting get worse or appear for the first time. · You have new pain in your back just below your rib cage. This is called flank pain. · There is new blood or pus in your urine. · You have any problems with your antibiotic medicine. Watch closely for changes in your health, and be sure to contact your doctor if: 
· You are not getting better after taking an antibiotic for 2 days. · Your symptoms go away but then come back. Where can you learn more? Go to http://alex-adriane.info/ Enter X822 in the search box to learn more about \"Urinary Tract Infection in Women: Care Instructions. \" Current as of: August 22, 2019               Content Version: 12.5 © 4655-0655 Healthwise, Incorporated. Care instructions adapted under license by [employer] (which disclaims liability or warranty for this information). If you have questions about a medical condition or this instruction, always ask your healthcare professional. [address] any warranty or liability for your use of this information.

## 2020-07-09 RX ORDER — LANOLIN ALCOHOL/MO/W.PET/CERES
CREAM (GRAM) TOPICAL
Qty: 90 TAB | Refills: 0 | Status: SHIPPED | OUTPATIENT
Start: 2020-07-09 | End: 2020-11-13 | Stop reason: SDUPTHER

## 2020-11-13 RX ORDER — LANOLIN ALCOHOL/MO/W.PET/CERES
CREAM (GRAM) TOPICAL
Qty: 90 TAB | Refills: 0 | Status: SHIPPED | OUTPATIENT
Start: 2020-11-13

## 2020-11-13 NOTE — TELEPHONE ENCOUNTER
This pharmacy faxed over request for the following prescriptions to be filled:    Medication requested :   Requested Prescriptions     Pending Prescriptions Disp Refills    ferrous sulfate 325 mg (65 mg iron) tablet 90 Tab 0     PCP: [address] [place] [address] or Print: [facility]   Mail order or Local pharmacy [address]     Scheduled appointment if not seen by current providers in office: LOV 6/2/2020 No f/u up Scheduled at this time.   LMOV to schedule CPE due Now

## 2022-03-18 PROBLEM — E55.9 HYPOVITAMINOSIS D: Status: ACTIVE | Noted: 2017-04-10

## 2022-03-18 PROBLEM — Z99.89 OSA ON CPAP: Status: ACTIVE | Noted: 2017-05-05

## 2022-03-18 PROBLEM — G47.33 OSA ON CPAP: Status: ACTIVE | Noted: 2017-05-05

## 2022-03-18 PROBLEM — N39.3 SUI (STRESS URINARY INCONTINENCE, FEMALE): Status: ACTIVE | Noted: 2017-03-03

## 2022-03-19 PROBLEM — K91.2 POSTOPERATIVE MALABSORPTION: Status: ACTIVE | Noted: 2017-08-09

## 2022-03-19 PROBLEM — R79.89 LOW VITAMIN D LEVEL: Status: ACTIVE | Noted: 2017-08-09

## 2022-03-19 PROBLEM — R53.82 CHRONIC FATIGUE: Status: ACTIVE | Noted: 2017-03-03

## 2022-03-19 PROBLEM — G47.10 HYPERSOMNOLENCE: Status: ACTIVE | Noted: 2017-03-03

## 2022-03-19 PROBLEM — G47.33 OSA (OBSTRUCTIVE SLEEP APNEA): Status: ACTIVE | Noted: 2017-05-04

## 2022-03-20 PROBLEM — R73.03 PREDIABETES: Status: ACTIVE | Noted: 2017-05-05

## 2023-05-12 RX ORDER — FERROUS SULFATE 325(65) MG
1 TABLET ORAL
COMMUNITY
Start: 2020-11-13

## 2023-05-12 RX ORDER — LORATADINE 10 MG/1
10 TABLET ORAL
COMMUNITY

## 2023-05-12 RX ORDER — ERGOCALCIFEROL 1.25 MG/1
CAPSULE ORAL
COMMUNITY
Start: 2020-02-24

## (undated) DEVICE — STAPLE INT BIOABSRB REINF FOR ETHICON FLX ENDOPATH 60 PWR +

## (undated) DEVICE — RELOAD STPL L60MM H1.5-3.6MM REG TISS BLU GRIPPING SURF B

## (undated) DEVICE — 3M™ TRANSPORE™ TAPE 1527-3: Brand: 3M™ TRANSPORE™

## (undated) DEVICE — PACK PROCEDURE SURG LAPAROSCOPY 17X7 MM BRTRC PRIMUS

## (undated) DEVICE — SOLUTION IRRIG 1000ML H2O STRL BLT

## (undated) DEVICE — SUTURE VCRL SZ 2-0 L54IN ABSRB VLT W/O NDL POLYGLACTIN 910 J618H

## (undated) DEVICE — FLEX ADVANTAGE 3000CC: Brand: FLEX ADVANTAGE

## (undated) DEVICE — KENDALL SCD EXPRESS SLEEVES, KNEE LENGTH, MEDIUM: Brand: KENDALL SCD

## (undated) DEVICE — STAPLER SKIN L440MM 32MM LNG 12 FIRING B FRM PWR + GRIPPING

## (undated) DEVICE — SUTURE VCRL SZ 3-0 L27IN ABSRB VLT L26MM SH 1/2 CIR J316H

## (undated) DEVICE — SUTURE MCRYL SZ 4-0 L27IN ABSRB UD L24MM PS-1 3/8 CIR PRIM Y935H

## (undated) DEVICE — SOLUTION LACTATED RINGERS INJECTION USP

## (undated) DEVICE — 3M™ BAIR PAWS FLEX™ WARMING GOWN, STANDARD, 20 PER CASE 81003: Brand: BAIR PAWS™

## (undated) DEVICE — TRUE CONTENT TO BE POPULATED AS PART OF REBRANDING: Brand: ARGYLE

## (undated) DEVICE — (D)DRSG BORD MPLX SACRUM 23X23 -- DISC BY MFR USE ITEM 340908

## (undated) DEVICE — SUT SLK 2-0SH 30IN BLK --

## (undated) DEVICE — Device

## (undated) DEVICE — INTENDED FOR TISSUE SEPARATION, AND OTHER PROCEDURES THAT REQUIRE A SHARP SURGICAL BLADE TO PUNCTURE OR CUT.: Brand: BARD-PARKER SAFETY BLADES SIZE 15, STERILE

## (undated) DEVICE — GLOVE SURG SZ 7.5 L11.73IN FNGR THK9.8MIL STRW LTX POLYMER

## (undated) DEVICE — BLADELESS OPTICAL TROCAR WITH FIXATION CANNULA: Brand: VERSAPORT

## (undated) DEVICE — DERMABOND SKIN ADH 0.7ML -- DERMABOND ADVANCED 12/BX

## (undated) DEVICE — TRAY CATH OD16FR SIL URIN M STATLOK STBL DEV SURSTP

## (undated) DEVICE — SHEARS ENDOSCP L36CM DIA5MM ULTRASONIC CRV TIP W/ ADV

## (undated) DEVICE — RELOAD STPL L60MM H1-2.6MM MESENTERY THN TISS WHT 6 ROW

## (undated) DEVICE — REM POLYHESIVE ADULT PATIENT RETURN ELECTRODE: Brand: VALLEYLAB

## (undated) DEVICE — BLADELESS OPTICAL TROCAR WITH FIXATION CANNULA: Brand: VERSAONE